# Patient Record
Sex: MALE | Race: WHITE
[De-identification: names, ages, dates, MRNs, and addresses within clinical notes are randomized per-mention and may not be internally consistent; named-entity substitution may affect disease eponyms.]

---

## 2018-04-16 NOTE — HP
HISTORY AND PHYSICAL:

 

DATE OF SURGERY:  04/24/18

 

DATE OF OFFICE VISIT:  04/13/18

 

SURGEON:  Latasha Loya MD * (DICTATED BY ILSA LOYA)

 

PROCEDURE:  Left total hip arthroplasty.

 

CHIEF COMPLAINT:  Left hip pain.

 

HISTORY OF PRESENT ILLNESS:  Mr. Tolliver is an 82-year-old gentleman with 
complaints of left hip pain.  He failed conservative management and elected to 
proceed with the left total hip arthroplasty which is scheduled for 04/24/18 
with Dr. Loya.

 

PAST MEDICAL HISTORY:  Coronary artery disease; prior AFib; COPD; hypertension; 
BPH; diabetes, diet controlled; hyperlipidemia; and a prior MI.

 

PAST SURGICAL HISTORY:  Left atrial ablation, tonsillectomy, adenoidectomy, 
appendectomy, left parotidectomy, and cataract removal.

 

CURRENT MEDICATIONS:

1.  Atorvastatin.

2.  Calcium 10 mg q.h.s.

3.  Metoprolol 25 mg half tab daily.

4.  Saw palmetto.

5.  Allopurinol 300 mg every day.

6.  Respimat inhaler.

7.  Vitamin C.

8.  Naproxen as needed.

9.  Calcium.

10.  Aspirin 81 mg daily.

 

ALLERGIES:  PENICILLIN.

 

FAMILY HISTORY:  Heart disease and osteoarthritis.

 

SOCIAL HISTORY:  This is an 82-year-old gentleman, lives with his wife.  He is 
a former smoker, quit 25 years ago.  Denies use of drugs.  Uses occasional 
alcohol.

 

REVIEW OF SYSTEMS:  A complete 14-point review of systems was reviewed with the 
patient, was positive for diabetes, COPD, and occasional shortness of breath.  
He denies history of DVT, PE, hepatitis C, or HIV.

 

                               PHYSICAL EXAMINATION

 

GENERAL:  He is well-developed, well-nourished, in no acute distress.

 

VITAL SIGNS:  He stands 6 feet tall, weighs 227 pounds.  His blood pressure is 
160/80, his heart rate is 65.

 

HEENT:  Normocephalic, atraumatic.

 

NECK:  Supple.  No palpable lymph nodes.

 

PULMONARY:  The lungs are clear to auscultation bilaterally.

 

CARDIO:  Regular rate and rhythm.  Strong S1 and S2.

 

ABDOMEN:  Soft, nontender, nondistended.

 

NEUROLOGIC:  He is alert and oriented x3.  Cranial nerves II through XII are 
intact.

 

MUSCULOSKELETAL:  Left lower extremity, the skin is intact.  There are no open 
wounds or abrasions.  He walks with antalgic type gait, favoring his left hip.  
He has decreased internal and external rotation of the left hip.  He has 2+ 
dorsalis pedis pulses, intact sensation.  His lower extremity muscle group 
strengths are intact at 5/5.

 

 ASSESSMENT AND PLAN:  Mr. Tolliver is an 82-year-old gentleman with continued 
complaints of left hip pain.  He has failed conservative management and elected 
to proceed with the left total hip arthroplasty, which is scheduled for 04/24/
18 with Dr. Loya.  Dr. Loya discussed the risks and benefits of the surgery 
at today's visit and all of his questions were answered.  He will follow up 
with Dr. Loya in 2 weeks after the surgery.

 

 ____________________________________ LISA LOYA

 

[id number]/CPS #: [id number]

MTDD

## 2018-04-24 ENCOUNTER — HOSPITAL ENCOUNTER (INPATIENT)
Dept: HOSPITAL 25 - AA | Age: 83
LOS: 2 days | Discharge: HOME HEALTH | DRG: 470 | End: 2018-04-26
Attending: ORTHOPAEDIC SURGERY | Admitting: ORTHOPAEDIC SURGERY
Payer: MEDICARE

## 2018-04-24 DIAGNOSIS — Z82.61: ICD-10-CM

## 2018-04-24 DIAGNOSIS — F32.9: ICD-10-CM

## 2018-04-24 DIAGNOSIS — R31.9: ICD-10-CM

## 2018-04-24 DIAGNOSIS — J44.9: ICD-10-CM

## 2018-04-24 DIAGNOSIS — I08.1: ICD-10-CM

## 2018-04-24 DIAGNOSIS — E11.9: ICD-10-CM

## 2018-04-24 DIAGNOSIS — I25.9: ICD-10-CM

## 2018-04-24 DIAGNOSIS — Z90.49: ICD-10-CM

## 2018-04-24 DIAGNOSIS — Z88.0: ICD-10-CM

## 2018-04-24 DIAGNOSIS — N32.89: ICD-10-CM

## 2018-04-24 DIAGNOSIS — Z98.41: ICD-10-CM

## 2018-04-24 DIAGNOSIS — Z88.8: ICD-10-CM

## 2018-04-24 DIAGNOSIS — Z79.01: ICD-10-CM

## 2018-04-24 DIAGNOSIS — N40.0: ICD-10-CM

## 2018-04-24 DIAGNOSIS — M16.12: Primary | ICD-10-CM

## 2018-04-24 DIAGNOSIS — Z98.42: ICD-10-CM

## 2018-04-24 DIAGNOSIS — Z87.891: ICD-10-CM

## 2018-04-24 DIAGNOSIS — M10.9: ICD-10-CM

## 2018-04-24 DIAGNOSIS — Z81.8: ICD-10-CM

## 2018-04-24 DIAGNOSIS — Z95.5: ICD-10-CM

## 2018-04-24 DIAGNOSIS — N39.0: ICD-10-CM

## 2018-04-24 DIAGNOSIS — E66.9: ICD-10-CM

## 2018-04-24 DIAGNOSIS — I25.2: ICD-10-CM

## 2018-04-24 DIAGNOSIS — I10: ICD-10-CM

## 2018-04-24 DIAGNOSIS — E78.5: ICD-10-CM

## 2018-04-24 DIAGNOSIS — Z79.82: ICD-10-CM

## 2018-04-24 DIAGNOSIS — I48.0: ICD-10-CM

## 2018-04-24 DIAGNOSIS — Z82.49: ICD-10-CM

## 2018-04-24 DIAGNOSIS — Z72.89: ICD-10-CM

## 2018-04-24 DIAGNOSIS — I25.10: ICD-10-CM

## 2018-04-24 PROCEDURE — C1713 ANCHOR/SCREW BN/BN,TIS/BN: HCPCS

## 2018-04-24 PROCEDURE — 88311 DECALCIFY TISSUE: CPT

## 2018-04-24 PROCEDURE — 72170 X-RAY EXAM OF PELVIS: CPT

## 2018-04-24 PROCEDURE — 81003 URINALYSIS AUTO W/O SCOPE: CPT

## 2018-04-24 PROCEDURE — 88304 TISSUE EXAM BY PATHOLOGIST: CPT

## 2018-04-24 PROCEDURE — 85018 HEMOGLOBIN: CPT

## 2018-04-24 PROCEDURE — 85610 PROTHROMBIN TIME: CPT

## 2018-04-24 PROCEDURE — 85014 HEMATOCRIT: CPT

## 2018-04-24 PROCEDURE — 85049 AUTOMATED PLATELET COUNT: CPT

## 2018-04-24 PROCEDURE — 87086 URINE CULTURE/COLONY COUNT: CPT

## 2018-04-24 PROCEDURE — 71045 X-RAY EXAM CHEST 1 VIEW: CPT

## 2018-04-24 PROCEDURE — 81015 MICROSCOPIC EXAM OF URINE: CPT

## 2018-04-24 PROCEDURE — 83036 HEMOGLOBIN GLYCOSYLATED A1C: CPT

## 2018-04-24 PROCEDURE — 0SRB04A REPLACEMENT OF LEFT HIP JOINT WITH CERAMIC ON POLYETHYLENE SYNTHETIC SUBSTITUTE, UNCEMENTED, OPEN APPROACH: ICD-10-PCS | Performed by: ORTHOPAEDIC SURGERY

## 2018-04-24 PROCEDURE — C1776 JOINT DEVICE (IMPLANTABLE): HCPCS

## 2018-04-24 PROCEDURE — 36415 COLL VENOUS BLD VENIPUNCTURE: CPT

## 2018-04-24 PROCEDURE — 80048 BASIC METABOLIC PNL TOTAL CA: CPT

## 2018-04-24 RX ADMIN — Medication SCH INH: at 17:20

## 2018-04-24 RX ADMIN — MAGNESIUM HYDROXIDE SCH ML: 400 SUSPENSION ORAL at 20:55

## 2018-04-24 RX ADMIN — ALLOPURINOL SCH MG: 300 TABLET ORAL at 17:16

## 2018-04-24 RX ADMIN — ASPIRIN SCH MG: 81 TABLET, COATED ORAL at 17:16

## 2018-04-24 RX ADMIN — INSULIN LISPRO SCH: 100 INJECTION, SOLUTION INTRAVENOUS; SUBCUTANEOUS at 17:05

## 2018-04-24 RX ADMIN — IBUPROFEN SCH MG: 600 TABLET, FILM COATED ORAL at 17:15

## 2018-04-24 RX ADMIN — DOCUSATE SODIUM SCH MG: 100 CAPSULE, LIQUID FILLED ORAL at 20:55

## 2018-04-24 RX ADMIN — METOPROLOL SUCCINATE SCH MG: 25 TABLET, EXTENDED RELEASE ORAL at 17:17

## 2018-04-24 NOTE — XMS REPORT
Tom Tolliver

 Created on:2018



Patient:Tom Tolliver

Sex:Male

:1935

External Reference #:[id number]





Demographics







 Address  [address]



   [place] [address]

 

 Home Phone  [phone]

 

 Mobile Phone  [phone]

 

 Email Address  [email or web address]

 

 Preferred Language  English

 

 Marital Status  Not  Or 

 

 Episcopal Affiliation  Unknown

 

 Race  White

 

 Ethnic Group  Not  Or 









Author







 Organization  [facility]

 

 Address  [address] [address]

 

 Phone  [phone]









Support







 Name  Relationship  Address  Phone

 

 Cintia Estevez  Unavailable  Unavailable  [phone]









Care Team Providers







 Name  Role  Phone

 

 Kodak Aguilera MD  Primary Care Physician  Unavailable









Payers







 Type  Date  Identification Numbers  Payment  Subscriber



       Provider  

 

 Health Maintenance  Effective:  Policy Number:  Medicare Blue  Tom Tolliver



 [facility] ([facility]  2013  [id number]  o  









 Group Number: [id number]  [address] [address]

 

 PayID:   [place] [id number]







Problems







 Date  Description  Provider  Status

 

 Onset: 2013  Postsurgical Status Other  Mitzi Bishop D.O.  Active

 

 Onset: 2013  Atrial fibrillation  Mitzi Bishop D.O.  Active

 

 Onset: 2014  Benign essential hypertension  Ian Sandoval M.D., [facility],  
Active



     [facility]  

 

 Onset: 2014  Hyperlipidemia  Ian Sandoval M.D., [facility],  Active



     [facility]  

 

 Onset: 2014  Coronary arteriosclerosis  Ian Sandoval M.D., [facility],  Active



     [facility]  

 

 Onset: 10/01/2014  Chronic ischemic heart disease  Ian Sandoval M.D., [facility],  
Active



     [facility]  

 

 Onset: 2016  Essential hypertension  Ian Sandoval M.D., [facility],  Active



     [facility]  

 

 Onset: 2016  Paroxysmal atrial fibrillation  Ian Sandoval M.D., [facility],  
Active



     [facility]  

 

 Onset: 2018  Preoperative cardiovascular  Ian Sandoval M.D., [facility],  
Active



   examination  [facility]  

 

 Onset: 2018  Localized, primary osteoarthritis  Latasha Loya M.D.  
Active



   of the pelvic region and thigh    







Family History







 Date  Family Member(s)  Problem(s)  Comments

 

   General  Heart Disease  

 

   Children  One son , one living.  Son living in good health.







Social History







 Type  Date  Description  Comments

 

 Marital Status      

 

 Lives With    Spouse  

 

 Occupation    Physician's Assistant  Retired

 

 ETOH Use    Occasionally consumes alcohol  one or two drinks daily

 

 Recreational Drug Use    Denies Drug Use  

 

 Smoking    Patient is a former smoker  

 

 Daily Caffeine    Consumes on average 3 cups of  



     regular coffee per day  

 

 Daily Caffeine    consumes chocolate frequently  

 

 Exercise Type/Frequency    Exercises regularly  exercise class twice a



       week and walking the dog



       daily







Allergies, Adverse Reactions, Alerts







 Date  Description  Reaction  Status  Severity  Comments

 

 2013  Penicillins    active  Severe  

 

 2013  NKDA    inactive    

 

 2018  Statins  myalgias  inactive    







Medications







 Medication  Date  Status  Form  Strength  Qnty  SIG  Indications  Ordering



                 Provider

 

 Metoprolol    Active  Tablets  25mg  90tab  1/2 tab by    Ian



 Succinate ER  /    ER 24HR    s  mouth daily    TIA Sandoval,



                 [facility],



                 [facility]

 

 St Johns Wort    Active  Capsules  300mg    3 tabs daily    Other



 Extract  /2013              Ordering



                 Provider

 

 Paramjit Concord    Active  Capsules  500mg    1080mg daily                  Ordering



                 Provider

 

 Allopurinol    Active  Tablets  300mg    1 by mouth    Unknown



   /0000          every day    

 

 Stiolto    Active  Aerosol  2.5-2.5mc    2 puff once    Unknown



 Respimat  /0000      g/Act    daily    

 

 Vitamin C    Active            Unknown



   /0000              

 

 Naproxen    Active  Tablets  250mg  60tab  1 tablet by    Unknown



   /0000        s  mouth twice a    



             day as needed    



             pain, with    



             foods    

 

 Calcium    Active            Unknown



   /0000              

 

                 

 

 Meloxicam    Hx  Tablets  15mg  30tab  1 by mouth  M25.552  Latasha



           s  every day Monse Chatman M.D.



                 

 

 Pravastatin    Hx  Tablets  10mg  45tab  1 by mouth    Ian



 Sodium  /        s  every other    Monse Sandoval.    M.D.,



                 [facility]              [facility]

 

 Atorvastatin    Hx  Tablets  10mg  90tab  take 1 tablet    Ian



 Calcium  /        s  at bedtime    Monse Sandoval M.D.,



                 [facility]              [facility]

 

 Warfarin Sodium    Hx  Tablets  5mg  180ta  take 1-2 tabs  I48.0          bs  by mouth    Jaime,



   -          daily as    M.D.,



             directed    [facility]              [facility]

 

 Atorvastatin    Hx  Tablets  20mg  90tab  take 1 tablet    Ian



 Calcium          s  at bedtime -    Jaime,



   -          Patient    MBHAVIN,



             Stopped    [facility]          Taking This    [facility]



             Last Night    

 

 Metoprolol    Hx  Tablets  25mg    1 po qd    Mitzi



 Succinate ER      ER 24HR          Edna



   -              D.O.



                 

 

 Metoprolol    Hx  Tablets  25mg  90tab  1 po daily    Mitzi



 Succinate ER      ER 24HR    s      Edna



   -              D.O.



                 

 

 Lisinopril    Hx  Tablets  10mg  90tab  1 by mouth            s  every day    Monse Sandoval M.D.,



                 [facility]              [facility]

 

 Clopidogrel    Hx  Tablets  75mg  90tab  1 po qd    Mitzi        s      Monse Bishop              D.O.



                 

 

 Atorvastatin    Hx  Tablets  10mg  90tab  1 po qd    Mitzi



 Calcium          s      Monse Bishop              D.O.



                 

 

 Coumadin    Hx  Tablets  5mg  90tab  1 tablet by    Mitzi        s  mouth daily    Edna,



   -          or as    D.O.



                 

 

 Home Inr Mahine    Hx      1unit  Check inr    Mitzi



 And Test Strips          s  weekly/prn as    Monse Bishop          directed    D.O.



                 

 

 Carvedilol    Hx  Tablets  6.25mg  180ta  1 po bid            bs      Ordering



   -              Provider



                 

 

 Lisinopril    Hx  Tablets  10mg  90tab  1 po qd    Other



           s      Ordering



   -              Provider



                 

 

 Atorvastatin    Hx  Tablets  10mg  90tab  1 po qd    Other



 Calcium          s      Ordering



   -              Provider



                 

 

 Spiriva    Hx  Capsules  18mcg  30cap  1 inhalation    Other



 Handihaler  /        s  po qam    Ordering



   -              Provider



                 

 

 Testosterone    Hx  Oil  200mg/ml  2unit  inject 1.0    Other



 Cypionate  /        s  ml once every    Ordering



   -          other week    Provider



                 

 

 Vitamin D-3    Hx  Tablets  400Unit  90tab  1 po qd            s      Ordering



   -              Provider



                 

 

 Coreg CR    Hx  Caps ER  10mg  30cap  1 capsule by        24HR    s  mouth daily    Monse Bishop              D.O.



                 

 

 Nitroglycerin    Hx  Tablets  0.4mg  25tab  1        Sub    s  sublingually    Edna,



   -          prn chest    D.O.



             pain    



                 

 

 Coumadin    Hx  Tablets  5mg  30tab  1 tablet by            s  mouth daily    Edna,



   -          or as    D.O.



             directed    



                 

 

 Aspirin Low    Hx  Tablets  81mg  30tab  1 po qd    Mitzi



 Dose  /2013        s      Edna



   -              D.O.



                 

 

 Plavix    Hx  Tablets  75mg  90tab  1 po qd            denisse Bishop



   -              D.O.



                 

 

 Lisinopril  00  Hx  Tablets  5mg  90tab  1 by mouth    Ian



   /0000        s  every day    Monse Sandoval M.D.,



                 [facility],



   /              [facility]







Immunizations







 CPT Code  Status  Date  Vaccine  Lot #

 

 [id number]  Given  10/01/2014  Influenza Virus 3Yrs &amp; Over  







Vital Signs







 Date  Vital  Result  Comment

 

 2018  Height  69.25 inches  5'9.25"









 Weight  227.00 lb  

 

 Heart Rate  65 /min  

 

 BP Systolic  160 mmHg  

 

 BP Diastolic  80 mmHg  

 

 Respiratory Rate  15 /min  

 

 Body Temperature  98.3 F  

 

 Pain Level  1  

 

 BMI (Body Mass Index)  33.3 kg/m2  









 2018  Height  69.25 inches  5'9.25"









 Weight  225.00 lb  w/o shoes

 

 Heart Rate  62 /min  

 

 BP Systolic Sitting  142 mmHg  lue large cuff

 

 BP Diastolic Sitting  72 mmHg  lue large cuff

 

 BP Systolic Standing  162 mmHg  lue large cuff

 

 BP Diastolic Standing  68 mmHg  lue large cuff

 

 Respiratory Rate  18 /min  

 

 BMI (Body Mass Index)  33.0 kg/m2  

 

 Ejection Fraction  55-60%  echo 8/15/13









 2018  Height  69.25 inches  5'9.25"









 Weight  228.00 lb  w/ shoes

 

 Heart Rate  70 /min  

 

 BP Systolic Sitting  139 mmHg  lue large cuff

 

 BP Diastolic Sitting  68 mmHg  lue large cuff

 

 BP Systolic Standing  132 mmHg  lue large cuff

 

 BP Diastolic Standing  60 mmHg  lue large cuff

 

 Respiratory Rate  18 /min  

 

 BMI (Body Mass Index)  33.4 kg/m2  

 

 Ejection Fraction  55-60%  echo 8/15/13









 2018  Height  69.25 inches  5'9.25"









 Weight  225.00 lb  

 

 Heart Rate  64 /min  

 

 BP Systolic  158 mmHg  

 

 BP Diastolic  80 mmHg  

 

 BMI (Body Mass Index)  33.0 kg/m2  









 2016  Height  70 inches  5'10"









 Weight  219.00 lb  

 

 Heart Rate  66 /min  68

 

 BP Systolic Sitting  138 mmHg  right arm, reg cuff

 

 BP Diastolic Sitting  70 mmHg  right arm, reg cuff

 

 BP Systolic Standing  134 mmHg  right arm, reg cuff

 

 BP Diastolic Standing  68 mmHg  right arm, reg cuff

 

 Respiratory Rate  16 /min  

 

 BMI (Body Mass Index)  31.4 kg/m2  

 

 Ejection Fraction  55-60%  8/15/13









 2016  Height  70 inches  5'10"









 Weight  210.00 lb  

 

 Heart Rate  70 /min  72

 

 BP Systolic Sitting  106 mmHg  right arm, reg cuff

 

 BP Diastolic Sitting  64 mmHg  right arm, reg cuff

 

 BP Systolic Standing  100 mmHg  right arm, reg cuff

 

 BP Diastolic Standing  62 mmHg  right arm, reg cuff

 

 Respiratory Rate  16 /min  

 

 BMI (Body Mass Index)  30.1 kg/m2  

 

 Ejection Fraction  55-60%  8/15/13









 2015  Height  70 inches  5'10"









 Weight  207.00 lb  

 

 Heart Rate  74 /min  84

 

 BP Systolic  139 mmHg  Ra, Home BP cuff

 

 BP Diastolic  88 mmHg  Ra, Home BP cuff

 

 BP Systolic Sitting  128 mmHg  right arm, reg cuff

 

 BP Diastolic Sitting  78 mmHg  right arm, reg cuff

 

 BP Systolic Standing  120 mmHg  right arm, reg cuff

 

 BP Diastolic Standing  78 mmHg  right arm, reg cuff

 

 Respiratory Rate  16 /min  

 

 BMI (Body Mass Index)  29.7 kg/m2  

 

 Ejection Fraction  55-60%  8/15/13









 2015  Height  70 inches  5'10"









 Weight  208.00 lb  

 

 Heart Rate  68 /min  72

 

 BP Systolic Sitting  112 mmHg  left arm, reg cuff

 

 BP Diastolic Sitting  68 mmHg  left arm, reg cuff

 

 BP Systolic Standing  114 mmHg  left arm, reg cuff

 

 BP Diastolic Standing  68 mmHg  left arm, reg cuff

 

 Respiratory Rate  20 /min  

 

 BMI (Body Mass Index)  29.8 kg/m2  

 

 Ejection Fraction  55-60%  8/15/13









 2015  Height  70 inches  5'10"









 Weight  211.00 lb  w/o shoes

 

 Heart Rate  58 /min  standing- 58

 

 BP Systolic Sitting  158 mmHg  Ra, reg

 

 BP Diastolic Sitting  88 mmHg  Ra, reg

 

 BP Systolic Standing  150 mmHg  Ra, reg

 

 BP Diastolic Standing  80 mmHg  Ra, reg

 

 BMI (Body Mass Index)  30.3 kg/m2  

 

 Ejection Fraction  55-60%  8/15/13 ECHO









 10/01/2014  Height  70 inches  5'10"









 Weight  212.00 lb  

 

 Heart Rate  62 /min  70

 

 BP Systolic Sitting  144 mmHg  left arm, reg cuff

 

 BP Diastolic Sitting  90 mmHg  left arm, reg cuff

 

 BP Systolic Standing  134 mmHg  left arm, reg cuff

 

 BP Diastolic Standing  82 mmHg  left arm, reg cuff

 

 Respiratory Rate  14 /min  

 

 BMI (Body Mass Index)  30.4 kg/m2  









 2014  Height  70 inches  5'10"









 Weight  210.00 lb  with shoes

 

 Heart Rate  70 /min  64 sit and stand HR

 

 BP Systolic Sitting  130 mmHg  LA reg cuff

 

 BP Diastolic Sitting  82 mmHg  LA reg cuff

 

 BP Systolic Standing  126 mmHg  LA reg cuff

 

 BP Diastolic Standing  80 mmHg  LA reg cuff

 

 Respiratory Rate  16 /min  

 

 BMI (Body Mass Index)  30.1 kg/m2  









 2014  Height  70 inches  5'10"









 Weight  209.00 lb  

 

 Heart Rate  54 /min  60

 

 BP Systolic Sitting  134 mmHg  right arm, reg cuff

 

 BP Diastolic Sitting  80 mmHg  right arm, reg cuff

 

 BP Systolic Standing  122 mmHg  right arm, reg cuff

 

 BP Diastolic Standing  78 mmHg  right arm, reg cuff

 

 Respiratory Rate  16 /min  

 

 BMI (Body Mass Index)  30.0 kg/m2  









 2014  Height  72 inches  6'0"









 Weight  210.75 lb  

 

 Heart Rate  60 /min  

 

 BP Systolic Sitting  130 mmHg  

 

 BP Diastolic Sitting  72 mmHg  

 

 BMI (Body Mass Index)  28.6 kg/m2  









 2013  Height  72 inches  6'0"









 Weight  211.50 lb  

 

 Heart Rate  60 /min  Regular

 

 BP Systolic Sitting  130 mmHg  

 

 BP Diastolic Sitting  76 mmHg  

 

 BMI (Body Mass Index)  28.7 kg/m2  









 2013  Height  72 inches  6'0"









 Weight  207.00 lb  

 

 Heart Rate  60 /min  

 

 BP Systolic  122 mmHg  

 

 BP Diastolic  66 mmHg  

 

 BMI (Body Mass Index)  28.1 kg/m2  









 2013  Height  72 inches  6'0"









 Weight  208.00 lb  

 

 Heart Rate  60 /min  Irregular

 

 BP Systolic Sitting  110 mmHg  

 

 BP Diastolic Sitting  68 mmHg  

 

 BMI (Body Mass Index)  28.2 kg/m2  







Results







 Test  Date  Test  Result  H/L  Range  Note

 

 Basic Metabolic Panel  2016  Sodium  137 mmol/L    133-145  









 Potassium  4.7 mmol/L    3.5-5.0  

 

 Chloride  103 mmol/L    101-111  

 

 Co2 Carbon Dioxide  28 mmol/L    22-32  

 

 Anion Gap  6 mmol/L    2-11  

 

 Glucose  118 mg/dL  High    

 

 Creatinine  1.78 mg/dL  High  0.67-1.17  

 

 Calcium  9.9 mg/dL    8.6-10.3  

 

 Egfr Non-  36.9    &gt;60  

 

 Egfr   47.4    &gt;60  1

 

 Blood Urea Nitrogen  32 mg/dL  High  6-24  

 

 BUN/Creatinine Ratio  18.0    8-20  









 Laboratory test finding  2016  Alt (SGPT)  13 U/L    7-52  









 Ast (Sgot)  17 U/L    13-39  

 

 Uric Acid  6.5 mg/dL    4.4-7.6  









 Lipid Profile (Trig/Chol/HDL)  2016  Triglycerides  227 mg/dL      2









 Cholesterol  176 mg/dL      3

 

 HDL Cholesterol  36.4 mg/dL      4

 

 LDL Cholesterol  94 mg/dL      5









 Lipid Profile (Trig/Chol/HDL)  2014  Triglycerides  77 mg/dL      6, 7









 Cholesterol  127 mg/dL      6, 8

 

 HDL Cholesterol  42.9 mg/dL      6, 9

 

 LDL Cholesterol  69 mg/dL      6, 10









 Laboratory test finding  2014  Alt  13 U/L    7-52  6, 11









 Ast  17 U/L    13-39  6, 12









 Order  2014  Event Monitor  &lt;pending&gt;      

 

 Laboratory test finding  2013  Alt  14 U/L    14-54  









 Ast  17 U/L    12-42  









 Lipid Profile (Trig/Chol/HDL)  2013  Triglycerides  112 mg/dL      









 Cholesterol  175 mg/dL    Less than 200  

 

 HDL Cholesterol  43 mg/dL    40-60  13

 

 Cholesterol/HDL Ratio  4.1 Average    1-4.44  

 

 LDL Cholesterol  109.6  High  Less Than 100  14









 1  *******Because ethnic data is not always readily available,



   this report includes an eGFR for both -Americans and



   non- Americans.****



   The National Kidney Disease Education Program (NKDEP) does



   not endorse the use of the MDRD equation for patients that



   are not between the ages of 18 and 70, are pregnant, have



   extremes of body size, muscle mass, or nutritional status,



   or are non- or non-.



   According to the National Kidney Foundation, irrespective of



   diagnosis, the stage of the disease is based on the level of



   kidney function:



   Stage Description                      GFR(mL/min/1.73 m(2))



   1     Kidney damage with normal or decreased GFR       90



   2     Kidney damage with mild decrease in GFR          60-89



   3     Moderate decrease in GFR                         30-59



   4     Severe decrease in GFR                           15-29



   5     Kidney failure                       &lt;15 (or dialysis)

 

 2  Desirable &lt;150



   Borderline high 150-199



   High 200-499



   Very High &gt;500

 

 3  Desirable &lt;200



   Borderline high 200-239



   High &gt;239

 

 4  Low &lt;40



   Desirable: 40-60



   High: &gt;60

 

 5  Desirable: &lt;100 mg/dL



   Near Optimal: 100-129 mg/dL



   Borderline High: 130-159 mg/dL



   High: 160-189 mg/dL



   Very High: &gt;189 mg/dL

 

 6  FASTING

 

 7  Desirable &lt;150



   Borderline high 150-199



   High 200-499



   Very High &gt;500

 

 8  Desirable &lt;200



   Borderline high 200-239



   High &gt;239

 

 9  Low &lt;40



   Desirable: 40-60



   High: &gt;60

 

 10  Desirable &lt;100



   Near Optimal 100-129



   Borderline high 130-159



   High 160-189



   Very High &gt;189

 

 11  FASTING

 

 12  FASTING

 

 13  HDL Interpretation:



   Undesirable: High Risk:  Less than 40 mg/dL



   Desirable:  Low Risk:  Greater than 60 mg/dL

 

 14  LDL Interpretation:



   Low Risk Optimal Level:  LDL Less than 100 mg/dL



   Near or Above Optimal:  -129 mg/dL



   Borderline High Risk:  -159 mg/dL



   High Risk:  -189 mg/dL



   Very High Risk:  LDL Greater than 189 mg/dL







Procedures







 Date  CPT Code  Description  Status

 

 04/10/2018  73879  Treadmill Interp/Report Only  Completed

 

 04/10/2018  82178  Stress Test Supervsn W/Out I/R  Completed

 

 2018  75766  EKG Tracing &amp; Interpretation  Completed

 

 2017  26883  Destruction Of Benign Lesions Any Method 1-14 lesions  
Completed

 

 2017  22649  Dest Lesion Each Addl Lesion 2 Through 14 Each  Completed

 

 2017  10211  Destruction ALL Benign Or Premalignant Lesion (Other  
Completed



     Than Skintag  

 

 2016  05468  EKG Tracing &amp; Interpretation  Completed

 

 2015  19753  Holter Monitor Review (24 hr)dr sandra &amp; interp  
Completed



     only  

 

 2015  97534  Treadmill Interp/Report Only  Completed

 

 2015  50329  Treadmill Interp/Report Only  Completed

 

 2015  78522  Stress Test Supervsn W/Out I/R  Completed

 

 2015  39078  Stress Test Supervsn W/Out I/R  Completed

 

 2015  73843  EKG Tracing &amp; Interpretation  Completed

 

 2015  07541  EKG Tracing &amp; Interpretation  Completed

 

 2014  87874  Cardiac Event Monitor  Completed

 

 2014  28609  Stress Test Supervsn W/Out I/R  Completed

 

 2014  95136  Treadmill Interp/Report Only  Completed

 

 2014  95707  EKG Tracing &amp; Interpretation  Completed

 

 2013  96202  EKG Tracing &amp; Interpretation  Completed

 

 2013  35846  EKG Tracing &amp; Interpretation  Completed

 

 2013  17238  EKG, Interpretation Only  Completed

 

 2013  84177  Left Heart Cath. Incl S/I Coronaries, Angio S/I V Gram  
Completed



     If Done  

 

 2013  77003  Cath PLMT&amp;NJX L Ventriculog Img S&amp;I  Completed

 

 2013  15472  EKG, Interpretation Only  Completed

 

 2013  57480  Ptca W/Intracor Stent  Completed

 

 2013  35127  Revascularization Acute Total/Subtotal Occlusion  Completed

 

 2013  62981  IV Rx Trancath Therapy(Nitro/Maria Del Rosario)  Completed

 

 08/15/2013  78890  Color Flow Doppler/Interp &amp; Reprt  Completed

 

 08/15/2013  22455  Pulse Wave/Continuous-Interp.RPT  Completed

 

 08/15/2013  30723  ECHO Transthorasic Realtime 2D W Doppler &amp; Color  
Completed



     Flow Hosp  

 

 08/15/2013  65756  EKG, Interpretation Only  Completed







Encounters







 Type  Date  Location  Provider  CPT E/M  Dx

 

 Office Visit  2018  [facility]  Ian Sandoval M.D.,  [address]78  
Z01.810



   3:00p  [facility]    









 M16.0

 

 I25.10









 Office Visit  2018 10:40a  [facility]  Ian Sandoval M.D.,  
16726  I25.10



     [facility]    









 E78.5

 

 R06.02









 Office Visit  2018  2:00p  [facility]  Latasha Loya M.D.  
10551  M25.551



     C.M.ANatalia      









 M25.552

 

 M16.0









 Office Visit  2017  3:00p  [facility]  Nagi Curry MD  [address]  
L03.818

 

 Office Visit  2017  2:00p  [facility]  Nagi Curry MD  92979  
I78.8









 L82.1

 

 B35.1

 

 B07.0

 

 R23.8

 

 Z78.9

 

 L57.0









 Office Visit  2016 11:20a  [facility]  Ian Sandoval M.D.,  
46900  I48.0



     [facility]    









 I10

 

 I25.10

 

 E78.5









 Office Visit  2016  8:36a  [facility],  Jase Leos,  
21925  R04.0



     Hospitalists  M.D.    









 I48.2

 

 J44.9

 

 I10









 Office Visit  2016  8:36a  [facility]  [address]  
R04.0



     Assoc, Hospitalists  M.D.    









 I48.2

 

 J44.9

 

 I10









 Office Visit  2016 10:00a  [facility] Iván Sandoval M.D.,  
80064  I48.0



     [facility], [facility]    









 I10

 

 E78.5

 

 I25.9









 Office Visit  2015  2:20p  [facility] Iván Sandoval M.D.,  
73484  427.31



     [facility]    









 401.1

 

 272.4

 

 414.9









 Office Visit  2015  1:40p  [facility] Iván Sandoval M.D.,  
91086  427.31



     [facility]    









 401.1

 

 272.4

 

 414.9









 Office Visit  2015  9:17a  [facility] Jorge L Romo,  
08617  427.31



     Carmel WEBER    









 427.89









 Office Visit  2015  1:20p  [facility] Iván Sandoval M.D.,  
75081  414.9



     [facility], [facility]    









 272.4

 

 401.1









 Office Visit  10/01/2014  2:20p  [facility] Iván Sandoval M.D.,  
04345  427.31



     [facility]    









 401.1

 

 272.4

 

 414.9









 Office Visit  2014  3:00p  [facility] Iván Sandoval M.D.,  
30715  401.1



     [facility]    









 272.4

 

 414.01

 

 427.31









 Office Visit  2014  3:00p  [facility] Iván Sandoval M.D.,  
91966  414.01



     [facility]    









 401.1

 

 272.4

 

 427.31









 Office Visit  2014 10:40a  [facility]  Mitzi Bishop D.O.  
49227  414.01









 401.1

 

 272.4









 Office Visit  2013 10:40a  [facility]  Mitzi Bishop D.O.  
39116  414.01









 V45.89

 

 401.1

 

 272.4









 Office Visit  2013  1:20p  [facility]  Mitzi Bishop D.O.  
97388  427.31









 414.01

 

 410.70

 

 401.1

 

 272.4









 Office Visit  2013  1:20p  [facility]  Mitzi Bishop D.O.  
51206  V45.89









 410.70

 

 414.01

 

 427.31

 

 401.1

 

 272.4









 Office Visit  2013  4:13p  [facility],  Yusuf Barkley M.D.  
47015  410.71



     Hospitalists      









 401.9









 Office Visit  2013  9:19a  [facility]  Ray Cedillo  08972
  786.50



       MNataliaDNatalia    









 410.31

 

 401.1









 Office Visit  2013  4:12p  [facility],kumar Barkley M.D.  
20225  410.71



     Hospitalists      









 401.9









 Office Visit  2013  9:50a  [facility]  Ray Cedillo  97920
  427.31



       MNataliaDNatalia    









 427.32

 

 401.1









 Office Visit  08/15/2013  4:11p  [facility],  Yusuf Barkley M.D.  
44148  410.71



     Hospitalists      









 401.9









 Office Visit  08/15/2013  9:18a  [facility]  Ray SHIVANINatalia Majuliet,  71754
  427.31



       M.DNatalia    









 250.00









 Office Visit  2013  9:45a  [facility]  Janette Alvarez,  
44395  883.0



     Of NICK WEBER    

 

 Office Visit  2013  9:15a  [facility]  Janette Alvarez,  
31236  883.0



     Of NICK WEBER    







Plan of Care

Future Appointment(s):2018  1:15 pm - [facility] M.[facility]. at [facility]2018  7:30 am - Rao Rubio PA-C [facility]  7:30 am - LISA Benavidez [facility]  7:30 am - Latasha Loya M.D. [facility] - Latasha Loya M.D.M25.552 Pain in left hipFollow 
up:Follow up:   2 weeks after [id number] Unilateral primary osteoarthritis, 
left hip

## 2018-04-24 NOTE — XMS REPORT
Tom Tolliver

 Created on:2018



Patient:Tom Tolliver

Sex:Male

:1935

External Reference #:[id number]





Demographics







 Address  [address]



   [place] [address]

 

 Home Phone  [phone]

 

 Mobile Phone  [phone]

 

 Email Address  [email or web address]

 

 Preferred Language  English

 

 Marital Status  Not  Or 

 

 Anglican Affiliation  Unknown

 

 Race  White

 

 Ethnic Group  Not  Or 









Author







 Organization  [facility]

 

 Address  [address] [address]

 

 Phone  [phone]









Support







 Name  Relationship  Address  Phone

 

 Cintia Estevez  Unavailable  Unavailable  [phone]









Care Team Providers







 Name  Role  Phone

 

 Kodak Aguilera MD  Primary Care Physician  Unavailable









Payers







 Type  Date  Identification Numbers  Payment  Subscriber



       Provider  

 

 Health Maintenance  Effective:  Policy Number:  Medicare Blue  Tom Tolliver



 [facility] ([facility]  2013  [id number]  o  









 Group Number: [id number]  [address] [address]

 

 PayID:   [place] [id number]







Problems







 Date  Description  Provider  Status

 

 Onset: 2013  Postsurgical Status Other  Mitzi Bishop D.O.  Active

 

 Onset: 2013  Atrial fibrillation  Mitzi Bishop D.O.  Active

 

 Onset: 2014  Benign essential hypertension  Ian Sandoval M.D., [facility],  
Active



     [facility]  

 

 Onset: 2014  Hyperlipidemia  Ian Sandoval M.D., [facility],  Active



     [facility]  

 

 Onset: 2014  Coronary arteriosclerosis  Ian Sandoval M.D., [facility],  Active



     [facility]  

 

 Onset: 10/01/2014  Chronic ischemic heart disease  Ian Sandoval M.D., [facility],  
Active



     [facility]  

 

 Onset: 2016  Essential hypertension  Ian Sandoval M.D., [facility],  Active



     [facility]  

 

 Onset: 2016  Paroxysmal atrial fibrillation  Ian Sandoval M.D., [facility],  
Active



     [facility]  

 

 Onset: 2018  Preoperative cardiovascular  Ian Sandoval M.D., [facility],  
Active



   examination  [facility]  

 

 Onset: 2018  Localized, primary osteoarthritis  Latasha Loya M.D.  
Active



   of the pelvic region and thigh    







Family History







 Date  Family Member(s)  Problem(s)  Comments

 

   General  Heart Disease  

 

   Children  One son , one living.  Son living in good health.







Social History







 Type  Date  Description  Comments

 

 Marital Status      

 

 Lives With    Spouse  

 

 Occupation    Physician's Assistant  Retired

 

 ETOH Use    Occasionally consumes alcohol  one or two drinks daily

 

 Recreational Drug Use    Denies Drug Use  

 

 Smoking    Patient is a former smoker  

 

 Daily Caffeine    Consumes on average 3 cups of  



     regular coffee per day  

 

 Daily Caffeine    consumes chocolate frequently  

 

 Exercise Type/Frequency    Exercises regularly  exercise class twice a



       week and walking the dog



       daily







Allergies, Adverse Reactions, Alerts







 Date  Description  Reaction  Status  Severity  Comments

 

 2013  Penicillins    active  Severe  

 

 2018  Statins  myalgias  active    

 

 2013  NKDA    inactive    







Medications







 Medication  Date  Status  Form  Strength  Qnty  SIG  Indications  Ordering



                 Provider

 

 Meloxicam    Active  Tablets  15mg  30tab  1 by mouth  M25.552  Latasha        s  every day sherry Loya M.D.

 

 Metoprolol    Active  Tablets  25mg  90tab  1/2 tab by    Ian



 Succinate ER  /    ER 24HR    s  mouth daily    TIA Sandoval,



                 [facility],



                 [facility]

 

 St Johns Wort    Active  Capsules  300mg    3 tabs daily    Other



 Extract  /2013              Ordering



                 Provider

 

 Paramjit Valentine    Active  Capsules  500mg    1080mg daily                  Ordering



                 Provider

 

 Allopurinol    Active  Tablets  300mg    1 by mouth    Unknown



   /0000          every day    

 

 Stiolto    Active  Aerosol  2.5-2.5mc    2 puff once    Unknown



 Respimat  0000      g/Act    daily    

 

 Vitamin C    Active            Unknown



   /0000              

 

 Naproxen    Active  Tablets  250mg  60tab  1 tablet by    Unknown



   /0000        s  mouth twice a    



             day as needed    



             pain, with    



             foods    

 

 Calcium    Active            Unknown



   /0000              

 

                 

 

 Pravastatin    Hx  Tablets  10mg  45tab  1 by mouth    Ian



 Sodium  /        s  every other    Jaime



   -          lashawn.    M.D.,



                 [facility]              [facility]

 

 Atorvastatin    Hx  Tablets  10mg  90tab  take 1 tablet    Ian



 Calcium  /        s  at bedtime    Monse Sandoval M.D.,



                 [facility]              [facility]

 

 Warfarin Sodium  07/28  Hx  Tablets  5mg  180ta  take 1-2 tabs  I48.0  Ian



           bs  by mouth    Jaime,



   -          daily as    M.D.,



             directed    [facility]              [facility]

 

 Atorvastatin    Hx  Tablets  20mg  90tab  take 1 tablet    Ian



 Calcium          s  at bedtime -    Jaime,



   -          Patient    M.D.,



             Stopped    [facility]          Taking This    [facility]



             Last Night    

 

 Metoprolol    Hx  Tablets  25mg    1 po qd    Mitzi



 Succinate ER      ER 24HR          Edna



   -              D.O.



                 

 

 Metoprolol    Hx  Tablets  25mg  90tab  1 po daily    Mitzi



 Succinate ER      ER 24HR    s      Monse Bishop              D.O.



                 

 

 Lisinopril    Hx  Tablets  10mg  90tab  1 by mouth    Ian



           s  every day    Monse Sandoval M.D.,



                 [facility]              [facility]

 

 Clopidogrel    Hx  Tablets  75mg  90tab  1 po qd            s      Edna



   -              D.O.



                 

 

 Atorvastatin    Hx  Tablets  10mg  90tab  1 po qd    Mitzi



 Calcium          s      Edna



   -              D.O.



                 

 

 Coumadin    Hx  Tablets  5mg  90tab  1 tablet by    Mitzi



           s  mouth daily    Edna,



   -          or as    D.O.



             directed    



                 

 

 Home Inr Mahine    Hx      1unit  Check inr    Mitzi



 And Test Strips          s  weekly/prn as    Edna



   -          directed    D.O.



                 

 

 Carvedilol    Hx  Tablets  6.25mg  180ta  1 po bid            bs      Ordering



   -              Provider



                 

 

 Lisinopril    Hx  Tablets  10mg  90tab  1 po qd    Other



           s      Ordering



   -              Provider



                 

 

 Atorvastatin    Hx  Tablets  10mg  90tab  1 po qd    Other



 Calcium          s      Ordering



   -              Provider



                 

 

 Spiriva    Hx  Capsules  18mcg  30cap  1 inhalation    Other



 Handihaler  /        s  po qam    Ordering



   -              Provider



                 

 

 Testosterone    Hx  Oil  200mg/ml  2unit  inject 1.0    Other



 Cypionate          s  ml once every    Ordering



   -          other week    Provider



                 

 

 Vitamin D-3    Hx  Tablets  400Unit  90tab  1 po qd            s      Ordering



   -              Provider



                 

 

 Coreg CR    Hx  Caps ER  10mg  30cap  1 capsule by        24HR    s  mouth daily    Monse Bishop              D.O.



                 

 

 Nitroglycerin    Hx  Tablets  0.4mg  25tab  1        Sub    s  sublingually    Edna,



   -          prn chest    D.O.



             pain    



                 

 

 Coumadin    Hx  Tablets  5mg  30tab  1 tablet by            s  mouth daily    Edna,



   -          or as    D.O.



                 

 

 Aspirin Low    Hx  Tablets  81mg  30tab  1 po qd    Mitzi



 Dose  /2013        s      Edna



   -              D.O.



                 

 

 Plavix    Hx  Tablets  75mg  90tab  1 po qd            denisse Bishop



   -              D.O.



                 

 

 Lisinopril    Hx  Tablets  5mg  90tab  1 by mouth    Ian



   /0000        s  every day    Monse Sandoval M.D.,



                 [facility],



                 [facility]







Immunizations







 CPT Code  Status  Date  Vaccine  Lot #

 

 [id number]  Given  10/01/2014  Influenza Virus 3Yrs &amp; Over  







Vital Signs







 Date  Vital  Result  Comment

 

 2018  Height  69.25 inches  5'9.25"









 Weight  225.00 lb  w/o shoes

 

 Heart Rate  62 /min  

 

 BP Systolic Sitting  142 mmHg  lue large cuff

 

 BP Diastolic Sitting  72 mmHg  lue large cuff

 

 BP Systolic Standing  162 mmHg  lue large cuff

 

 BP Diastolic Standing  68 mmHg  lue large cuff

 

 Respiratory Rate  18 /min  

 

 BMI (Body Mass Index)  33.0 kg/m2  

 

 Ejection Fraction  55-60%  echo 8/15/13









 2018  Height  69.25 inches  5'9.25"









 Weight  228.00 lb  w/ shoes

 

 Heart Rate  70 /min  

 

 BP Systolic Sitting  139 mmHg  lue large cuff

 

 BP Diastolic Sitting  68 mmHg  lue large cuff

 

 BP Systolic Standing  132 mmHg  lue large cuff

 

 BP Diastolic Standing  60 mmHg  lue large cuff

 

 Respiratory Rate  18 /min  

 

 BMI (Body Mass Index)  33.4 kg/m2  

 

 Ejection Fraction  55-60%  echo 8/15/13









 2018  Height  69.25 inches  5'9.25"









 Weight  225.00 lb  

 

 Heart Rate  64 /min  

 

 BP Systolic  158 mmHg  

 

 BP Diastolic  80 mmHg  

 

 BMI (Body Mass Index)  33.0 kg/m2  









 2016  Height  70 inches  5'10"









 Weight  219.00 lb  

 

 Heart Rate  66 /min  68

 

 BP Systolic Sitting  138 mmHg  right arm, reg cuff

 

 BP Diastolic Sitting  70 mmHg  right arm, reg cuff

 

 BP Systolic Standing  134 mmHg  right arm, reg cuff

 

 BP Diastolic Standing  68 mmHg  right arm, reg cuff

 

 Respiratory Rate  16 /min  

 

 BMI (Body Mass Index)  31.4 kg/m2  

 

 Ejection Fraction  55-60%  8/15/13









 2016  Height  70 inches  5'10"









 Weight  210.00 lb  

 

 Heart Rate  70 /min  72

 

 BP Systolic Sitting  106 mmHg  right arm, reg cuff

 

 BP Diastolic Sitting  64 mmHg  right arm, reg cuff

 

 BP Systolic Standing  100 mmHg  right arm, reg cuff

 

 BP Diastolic Standing  62 mmHg  right arm, reg cuff

 

 Respiratory Rate  16 /min  

 

 BMI (Body Mass Index)  30.1 kg/m2  

 

 Ejection Fraction  55-60%  8/15/13









 2015  Height  70 inches  5'10"









 Weight  207.00 lb  

 

 Heart Rate  74 /min  84

 

 BP Systolic  139 mmHg  Ra, Home BP cuff

 

 BP Diastolic  88 mmHg  Ra, Home BP cuff

 

 BP Systolic Sitting  128 mmHg  right arm, reg cuff

 

 BP Diastolic Sitting  78 mmHg  right arm, reg cuff

 

 BP Systolic Standing  120 mmHg  right arm, reg cuff

 

 BP Diastolic Standing  78 mmHg  right arm, reg cuff

 

 Respiratory Rate  16 /min  

 

 BMI (Body Mass Index)  29.7 kg/m2  

 

 Ejection Fraction  55-60%  8/15/13









 2015  Height  70 inches  5'10"









 Weight  208.00 lb  

 

 Heart Rate  68 /min  72

 

 BP Systolic Sitting  112 mmHg  left arm, reg cuff

 

 BP Diastolic Sitting  68 mmHg  left arm, reg cuff

 

 BP Systolic Standing  114 mmHg  left arm, reg cuff

 

 BP Diastolic Standing  68 mmHg  left arm, reg cuff

 

 Respiratory Rate  20 /min  

 

 BMI (Body Mass Index)  29.8 kg/m2  

 

 Ejection Fraction  55-60%  8/15/13









 2015  Height  70 inches  5'10"









 Weight  211.00 lb  w/o shoes

 

 Heart Rate  58 /min  standing- 58

 

 BP Systolic Sitting  158 mmHg  Ra, reg

 

 BP Diastolic Sitting  88 mmHg  Ra, reg

 

 BP Systolic Standing  150 mmHg  Ra, reg

 

 BP Diastolic Standing  80 mmHg  Ra, reg

 

 BMI (Body Mass Index)  30.3 kg/m2  

 

 Ejection Fraction  55-60%  8/15/13 ECHO









 10/01/2014  Height  70 inches  5'10"









 Weight  212.00 lb  

 

 Heart Rate  62 /min  70

 

 BP Systolic Sitting  144 mmHg  left arm, reg cuff

 

 BP Diastolic Sitting  90 mmHg  left arm, reg cuff

 

 BP Systolic Standing  134 mmHg  left arm, reg cuff

 

 BP Diastolic Standing  82 mmHg  left arm, reg cuff

 

 Respiratory Rate  14 /min  

 

 BMI (Body Mass Index)  30.4 kg/m2  









 2014  Height  70 inches  5'10"









 Weight  210.00 lb  with shoes

 

 Heart Rate  70 /min  64 sit and stand HR

 

 BP Systolic Sitting  130 mmHg  LA reg cuff

 

 BP Diastolic Sitting  82 mmHg  LA reg cuff

 

 BP Systolic Standing  126 mmHg  LA reg cuff

 

 BP Diastolic Standing  80 mmHg  LA reg cuff

 

 Respiratory Rate  16 /min  

 

 BMI (Body Mass Index)  30.1 kg/m2  









 2014  Height  70 inches  5'10"









 Weight  209.00 lb  

 

 Heart Rate  54 /min  60

 

 BP Systolic Sitting  134 mmHg  right arm, reg cuff

 

 BP Diastolic Sitting  80 mmHg  right arm, reg cuff

 

 BP Systolic Standing  122 mmHg  right arm, reg cuff

 

 BP Diastolic Standing  78 mmHg  right arm, reg cuff

 

 Respiratory Rate  16 /min  

 

 BMI (Body Mass Index)  30.0 kg/m2  









 2014  Height  72 inches  6'0"









 Weight  210.75 lb  

 

 Heart Rate  60 /min  

 

 BP Systolic Sitting  130 mmHg  

 

 BP Diastolic Sitting  72 mmHg  

 

 BMI (Body Mass Index)  28.6 kg/m2  









 2013  Height  72 inches  6'0"









 Weight  211.50 lb  

 

 Heart Rate  60 /min  Regular

 

 BP Systolic Sitting  130 mmHg  

 

 BP Diastolic Sitting  76 mmHg  

 

 BMI (Body Mass Index)  28.7 kg/m2  









 2013  Height  72 inches  6'0"









 Weight  207.00 lb  

 

 Heart Rate  60 /min  

 

 BP Systolic  122 mmHg  

 

 BP Diastolic  66 mmHg  

 

 BMI (Body Mass Index)  28.1 kg/m2  









 2013  Height  72 inches  6'0"









 Weight  208.00 lb  

 

 Heart Rate  60 /min  Irregular

 

 BP Systolic Sitting  110 mmHg  

 

 BP Diastolic Sitting  68 mmHg  

 

 BMI (Body Mass Index)  28.2 kg/m2  







Results







 Test  Date  Test  Result  H/L  Range  Note

 

 Basic Metabolic Panel  2016  Sodium  137 mmol/L    133-145  









 Potassium  4.7 mmol/L    3.5-5.0  

 

 Chloride  103 mmol/L    101-111  

 

 Co2 Carbon Dioxide  28 mmol/L    22-32  

 

 Anion Gap  6 mmol/L    2-11  

 

 Glucose  118 mg/dL  High    

 

 Creatinine  1.78 mg/dL  High  0.67-1.17  

 

 Calcium  9.9 mg/dL    8.6-10.3  

 

 Egfr Non-  36.9    &gt;60  

 

 Egfr   47.4    &gt;60  1

 

 Blood Urea Nitrogen  32 mg/dL  High  6-24  

 

 BUN/Creatinine Ratio  18.0    8-20  









 Laboratory test finding  2016  Alt (SGPT)  13 U/L    7-52  









 Ast (Sgot)  17 U/L    13-39  

 

 Uric Acid  6.5 mg/dL    4.4-7.6  









 Lipid Profile (Trig/Chol/HDL)  2016  Triglycerides  227 mg/dL      2









 Cholesterol  176 mg/dL      3

 

 HDL Cholesterol  36.4 mg/dL      4

 

 LDL Cholesterol  94 mg/dL      5









 Lipid Profile (Trig/Chol/HDL)  2014  Triglycerides  77 mg/dL      6, 7









 Cholesterol  127 mg/dL      6, 8

 

 HDL Cholesterol  42.9 mg/dL      6, 9

 

 LDL Cholesterol  69 mg/dL      6, 10









 Laboratory test finding  2014  Alt  13 U/L    7-52  6, 11









 Ast  17 U/L    13-39  6, 12









 Order  2014  Event Monitor  &lt;pending&gt;      

 

 Laboratory test finding  2013  Alt  14 U/L    14-54  









 Ast  17 U/L    12-42  









 Lipid Profile (Trig/Chol/HDL)  2013  Triglycerides  112 mg/dL      









 Cholesterol  175 mg/dL    Less than 200  

 

 HDL Cholesterol  43 mg/dL    40-60  13

 

 Cholesterol/HDL Ratio  4.1 Average    1-4.44  

 

 LDL Cholesterol  109.6  High  Less Than 100  14









 1  *******Because ethnic data is not always readily available,



   this report includes an eGFR for both -Americans and



   non- Americans.****



   The National Kidney Disease Education Program (NKDEP) does



   not endorse the use of the MDRD equation for patients that



   are not between the ages of 18 and 70, are pregnant, have



   extremes of body size, muscle mass, or nutritional status,



   or are non- or non-.



   According to the National Kidney Foundation, irrespective of



   diagnosis, the stage of the disease is based on the level of



   kidney function:



   Stage Description                      GFR(mL/min/1.73 m(2))



   1     Kidney damage with normal or decreased GFR       90



   2     Kidney damage with mild decrease in GFR          60-89



   3     Moderate decrease in GFR                         30-59



   4     Severe decrease in GFR                           15-29



   5     Kidney failure                       &lt;15 (or dialysis)

 

 2  Desirable &lt;150



   Borderline high 150-199



   High 200-499



   Very High &gt;500

 

 3  Desirable &lt;200



   Borderline high 200-239



   High &gt;239

 

 4  Low &lt;40



   Desirable: 40-60



   High: &gt;60

 

 5  Desirable: &lt;100 mg/dL



   Near Optimal: 100-129 mg/dL



   Borderline High: 130-159 mg/dL



   High: 160-189 mg/dL



   Very High: &gt;189 mg/dL

 

 6  FASTING

 

 7  Desirable &lt;150



   Borderline high 150-199



   High 200-499



   Very High &gt;500

 

 8  Desirable &lt;200



   Borderline high 200-239



   High &gt;239

 

 9  Low &lt;40



   Desirable: 40-60



   High: &gt;60

 

 10  Desirable &lt;100



   Near Optimal 100-129



   Borderline high 130-159



   High 160-189



   Very High &gt;189

 

 11  FASTING

 

 12  FASTING

 

 13  HDL Interpretation:



   Undesirable: High Risk:  Less than 40 mg/dL



   Desirable:  Low Risk:  Greater than 60 mg/dL

 

 14  LDL Interpretation:



   Low Risk Optimal Level:  LDL Less than 100 mg/dL



   Near or Above Optimal:  -129 mg/dL



   Borderline High Risk:  -159 mg/dL



   High Risk:  -189 mg/dL



   Very High Risk:  LDL Greater than 189 mg/dL







Procedures







 Date  CPT Code  Description  Status

 

 04/10/2018  28559  Treadmill Interp/Report Only  Completed

 

 04/10/2018  55353  Stress Test Supervsn W/Out I/R  Completed

 

 2018  51224  EKG Tracing &amp; Interpretation  Completed

 

 2017  62687  Destruction Of Benign Lesions Any Method 1-14 lesions  
Completed

 

 2017  32446  Dest Lesion Each Addl Lesion 2 Through 14 Each  Completed

 

 2017  96675  Destruction ALL Benign Or Premalignant Lesion (Other  
Completed



     Than [place]  

 

 2016  57033  EKG Tracing &amp; Interpretation  Completed

 

 2015  54198  Holter Monitor Review (24 hr)dr flores &amp; tevin  
Completed



     only  

 

 2015  98346  Treadmill Interp/Report Only  Completed

 

 2015  26467  Treadmill Interp/Report Only  Completed

 

 2015  36858  Stress Test Supervsn W/Out I/R  Completed

 

 2015  51295  Stress Test Supervsn W/Out I/R  Completed

 

 2015  40417  EKG Tracing &amp; Interpretation  Completed

 

 2015  52772  EKG Tracing &amp; Interpretation  Completed

 

 2014  47916  Cardiac Event Monitor  Completed

 

 2014  48764  Stress Test Supervsn W/Out I/R  Completed

 

 2014  34464  Treadmill Interp/Report Only  Completed

 

 2014  29226  EKG Tracing &amp; Interpretation  Completed

 

 2013  57533  EKG Tracing &amp; Interpretation  Completed

 

 2013  07202  EKG Tracing &amp; Interpretation  Completed

 

 2013  90461  EKG, Interpretation Only  Completed

 

 2013  17676  Left Heart Cath. Incl S/I Coronaries, Angio S/I V Gram  
Completed



     If Done  

 

 2013  50133  Cath PLMT&amp;NJX L Ventriculog Img S&amp;I  Completed

 

 2013  12823  EKG, Interpretation Only  Completed

 

 2013  70954  Ptca W/Intracor Stent  Completed

 

 2013  59764  Revascularization Acute Total/Subtotal Occlusion  Completed

 

 2013  39259  IV Rx Trancath Therapy(Nitro/Maria Del Rosario)  Completed

 

 08/15/2013  07049  Color Flow Doppler/Interp &amp; Reprt  Completed

 

 08/15/2013  37117  Pulse Wave/Continuous-Interp.RPT  Completed

 

 08/15/2013  83996  ECHO Transthorasic Realtime 2D W Doppler &amp; Color  
Completed



     Flow Hosp  

 

 08/15/2013  83093  EKG, Interpretation Only  Completed







Encounters







 Type  Date  Location  Provider  CPT E/M  Dx

 

 Office Visit  2018  [facility]  Ian Sandoval M.D.,  [address]  
I25.10



   10:40a  [facility]    









 E78.5

 

 R06.02









 Office Visit  2018  2:00p  [facility]  Latasha Loya M.D.  
[address]  M25.551



     [facility]      









 M25.552

 

 M16.0









 Office Visit  2017  3:00p  [facility]  Nagi Curry MD  [address]  
L03.818

 

 Office Visit  2017  2:00p  [facility]  Nagi Curry MD  [address]483  
I78.8









 L82.1

 

 B35.1

 

 B07.0

 

 R23.8

 

 Z78.9

 

 L57.0









 Office Visit  2016 11:20a  [facility] Iván Sandoval M.D.,  
78955  I48.0



     [facility], [facility]    









 I10

 

 I25.10

 

 E78.5









 Office Visit  2016  8:36a  [facility]  Jase Leos,  
[address]  R04.0



     Hospitalists  [facility]    









 I48.2

 

 J44.9

 

 I10









 Office Visit  2016  8:36a  [facility] [place] [facility]  [address]  
R04.0



     [facility]  [facility]    









 I48.2

 

 J44.9

 

 I10









 Office Visit  2016 10:00a  [facility] Iván Sandoval M.D.,  
[address]  I48.0



     [facility], [facility]    









 I10

 

 E78.5

 

 I25.9









 Office Visit  2015  2:20p  [facility] Iván Sandoval M.D.,  
80853  427.31



     [facility]    









 401.1

 

 272.4

 

 414.9









 Office Visit  2015  1:40p  [facility] Iván Sandoval M.D.,  
31157  427.31



     [facility]    









 401.1

 

 272.4

 

 414.9









 Office Visit  2015  9:17a  [facility] Jorge L Romo,  
92100  427.31



     Carmel WEBER    









 427.89









 Office Visit  2015  1:20p  [facility] Iván Sandoval M.D.,  
51088  414.9



     [facility]    









 272.4

 

 401.1









 Office Visit  10/01/2014  2:20p  [facility] Iván Sandoval M.D.,  
57302  427.31



     [facility]    









 401.1

 

 272.4

 

 414.9









 Office Visit  2014  3:00p  [facility] Iván Sandoval M.D.,  
53986  401.1



     [facility], [facility]    









 272.4

 

 414.01

 

 427.31









 Office Visit  2014  3:00p  [facility]  Ian Sandoval M.D.,  
45490  414.01



     [facility], [facility]    









 401.1

 

 272.4

 

 427.31









 Office Visit  2014 10:40a  [facility]  Mitzi Bishop D.O.  
34763  414.01









 401.1

 

 272.4









 Office Visit  2013 10:40a  [facility]  Mitzi Bishop D.O.  
68824  414.01









 V45.89

 

 401.1

 

 272.4









 Office Visit  2013  1:20p  [facility]  Mitzi Bishop D.O.  
18454  427.31









 414.01

 

 410.70

 

 401.1

 

 272.4









 Office Visit  2013  1:20p  [facility]  Mitzi Bishop D.O.  
30400  V45.89









 410.70

 

 414.01

 

 427.31

 

 401.1

 

 272.4









 Office Visit  2013  4:13p  [facility],  Yusuf Barkley M.D.  
64508  410.71



     Hospitalists      









 401.9









 Office Visit  2013  9:19a  [facility]  Ray Cedillo,  81272
  786.50



       M.D.    









 410.31

 

 401.1









 Office Visit  2013  4:12p  [facility],  Yusuf Barkley M.D.  
86365  410.71



     Hospitalists      









 401.9









 Office Visit  2013  9:50a  [facility]  Ray Cedillo,  46182
  427.31



       M.D.    









 427.32

 

 401.1









 Office Visit  08/15/2013  4:11p  [facility],  Yusuf Barkley M.D.  
38428  410.71



     Hospitalists      









 401.9









 Office Visit  08/15/2013  9:18a  [facility]  Ray Cedillo,  56139
  427.31



       M.D.    









 250.00









 Office Visit  2013  9:45a  [facility]  Janette Alvarez,  
30830  883.0



     Of [facility]    

 

 Office Visit  2013  9:15a  [facility]  Janette Alvarez,  
71776  883.0



     [facility]    







Plan of Care

Future Appointment(s):2018  7:30 am - Rao Rubio PA-C at [facility].2018  7:30 am - LISA Benavidez at [facility].2018  7:30 am - Latasha Loya M.D. at [facility].2018 10:00 am - Latasha Loya M.D. at [facility].2018 - Ian Sandoval M.D., [facility], [id number] Encounter 
for preprocedural cardiovascular examinationComments:Your Lexiscan stress test 
showed no significant problems with  ischemia and had normal left ventricular 
function.Follow up:1 yearRecommendations:You are an acceptable risk for surgery 
from a cardiac standpoint. Continue your medications before and after the 
surgery.

## 2018-04-24 NOTE — RAD
INDICATION: COPD



COMPARISON: Chest x-ray dated April 13, 2018

 

TECHNIQUE: Single AP portable view of the chest was obtained.



FINDINGS: 



Image quality is compromised due to the relative inferiority of a portable chest x-ray.



The heart and mediastinum exhibit normal size and contour.



The lungs are grossly clear. There is no evidence of a large pleural effusion.



Visualized bones are normal for the patient's age.



IMPRESSION:  No radiographic evidence for acute cardiopulmonary abnormality on this

portable chest x-ray.

## 2018-04-24 NOTE — RAD
INDICATION:  Status post total left hip replacement surgery.



TECHNIQUE: An AP view of the pelvis was obtained in the operating room.



FINDINGS:  The patient is undergoing a total left hip replacement surgery. The acetabular

prosthesis is in place. There is also a femoral prostheses template in place.  



IMPRESSION:  INTRAOPERATIVE CONTROL FILMS.

## 2018-04-24 NOTE — OP
OPERATIVE REPORT:

 

DATE OF OPERATION:  04/24/18

 

DATE OF BIRTH:  [birth year]

 

SURGEON:  Latasha Loya MD

 

ASSISTANT:  LISA Amezcua

 

Ms. Rodriguez did help throughout the procedure with preparation of the leg, wound retraction, manipul
ation of the hip and wound closure.

 

ANESTHESIOLOGIST:  Dr. Williamson.

 

ANESTHESIA:  Spinal.

 

PRE-OP DIAGNOSIS:  Severe end-stage degenerative osteoarthritis of the left hip joint.

 

POST-OP DIAGNOSIS:  Severe end-stage degenerative osteoarthritis of the left hip joint.

 

OPERATIVE PROCEDURE:  Left total hip arthroplasty.

 

HARDWARE USED:  This is uncemented total hip arthroplasty hardware.  For the cup, a Tritanium cluster
 hole shell, 56E, 2 bone screws were used of length 20 mm and 25 mm.  For the insert, a Trident X3 0-
degree polyethylene insert 40E.  For the stem, an Accolade TMZF size 5 with a 127-degree neck.  For t
he head, a Biolox delta ceramic V40 femoral head 40 +0.

 

COMPLICATIONS:  None.

 

ESTIMATED BLOOD LOSS:  200 cc.

 

SPECIMENS:  Femoral head and acetabular reaming sent to Pathology.

 

BRIEF HISTORY/INDICATIONS:  Mr. Tolliver is an 82-year-old male with years of increasingly severe lef
t hip pain.  Radiographs showed advanced arthritis.  He failed conservative treatment with anti-infla
mmatory, home exercise program and the ambulatory assistive devices.  Due to continued pain and decre
ased quality of life, the patient elected to undergo left total hip arthroplasty.

 

Informed consent was obtained from the patient.  He understood the risks of surgery included, but wer
e not limited to bleeding, infection, damage to nearby structures, continued pain, need for further s
urgery, intraoperative fracture, nerve palsy, hardware failure or loosening, dislocation, leg length 
discrepancy, stroke, heart attack, blood clot, and death.  He wished to proceed.

 

INTRAOPERATIVE FINDINGS:  Intraoperatively, the patient was noted to have severe arthritis with compl
ete loss of cartilage along the acetabulum and femoral head.

 

DESCRIPTION OF PROCEDURE:  Mr. Tolliver was identified in the preanesthesia unit. His left lower extr
emity was marked as the correct operative side.  Informed consent was signed and placed in the chart.
  The patient was taken to the operating room and placed under spinal anesthesia without difficulty. 
 His Ford catheter was placed.  He was placed in the right lateral decubitus position on the pegboar
d. All bony prominences were well padded.  Left lower extremity was prepped and draped in the usual s
terile fashion.  Preop time-out was made to correctly identify the patient's side and site.  Appropri
ate perioperative antibiotics were given within 1 hour of incision.

 

A 12 cm posterior hip incision was made with a 10 blade.  Electrocautery was used to dissect down to 
the lateral fascia layer.  The 10 blade was used to incise the lateral fascia layer in line with the 
skin incision.  Charnley retractor was placed.  The piriformis and conjoint tendons were identified. 
 These were elevated off the posterolateral femur using electrocautery.  These were tagged with #5 Et
hibond.  Next, electrocautery was used to make a standard posterolateral capsular flap.  This was als
o tagged with #5 Ethibond.  The hip was carefully dislocated. Lesser troch to center of the femoral h
ead measured 62 mm.  Oscillating saw was used to make the appropriate femoral neck cut.  The femoral 
head was carefully removed.  The femur was retracted anteriorly.  After appropriate placement of retr
actors, the acetabulum was easily visualized.  A long-handled knife was used to sharply remove any re
maining labrum from the acetabular rim.

 

The acetabulum was sequentially reamed up to a size 55.  Bleeding subchondral bone bed was obtained. 
 A 55 trial had excellent fit with appropriate anteversion and abduction angle.  Final implant chosen
 was a Tritanium cluster hole shell 56E. This was impacted into the acetabulum without difficulty.  T
here was excellent stability.  There was appropriate anteversion and abduction angle.  Two screws wer
e placed in the superoposterior quadrant for extra stability.  These were Torx access screws with the
 length of 20 mm and 25 mm.  The liner chosen was a Trident X3 0- degree polyethylene insert 40E.  Th
is was impacted into the acetabulum without difficulty.  Stability of the liner was checked and reche
cked and was noted to be stable.

 

Next, attention was turned to preparation of proximal femur.  A canal finder was used to enter the pr
oximal femur.  Proximal femur was sequentially broached up to a size 5.  Size 5 broach had excellent 
fit and appropriate anteversion.  A 127-degree neck trial and a 40 +0 head trial were placed.  Lesser
 troch to center of the femoral head measured 62 mm.  The hip was reduced and taken through a range o
f motion.  The hip was stable in all positions.  The hip was carefully dislocated. All trials were re
moved.  The final implant chosen was an Accolade TMZF size 5 with a 127-degree neck.  This was impact
ed into the femoral canal without difficulty. There was excellent stability and appropriate anteversi
on noted.  40 +0 Biolox delta ceramic V40 head was chosen.  This was impacted onto the femoral neck. 
 The hip was reduced and taken through a range of motion.  The hip was stable in all positions.  Soft
 tissue tension and leg length were deemed to be appropriate.  The hip was copiously irrigated with s
terile saline.  Previously tagged capsule and tendons were reapproximated to the posterolateral femur
 through 2 trochanteric drill holes.  The lateral fascial layer was closed using interrupted #1 Vicry
l. The rest of the incision was closed in a layered fashion using 0 and 2-0 Vicryl. The skin was clos
ed using 3-0 Monocryl and Dermabond.  Sterile Adaptic, 4x4s, and paper tape were placed over the inci
ofelia.

 

The patient's anesthesia was reversed without difficulty.  He was taken to the PACU in stable conditi
on.  Intended weightbearing will be weightbearing as tolerated. Intended DVT prophylaxis will be Coum
joy with a Lovenox bridge.

 

 [id number]/[facility] #: [id number]

## 2018-04-24 NOTE — CONS
CC:  Dr. Aguilera; Dr. Loya*

 

CONSULTATION REPORT:

 

DATE OF CONSULT:  18

 

PRIMARY CARE PROVIDER:  Dr. Aguilera.

 

ATTENDING PHYSICIAN WHILE IN THE HOSPITAL:  Essence Godwin MD (report being 
dictated by Mauro Espinoza NP)

 

REQUESTING PHYSICIAN FOR CONSULT:  Dr. Loya.

 

REASON FOR MEDICAL CONSULTATION:  Evaluation of medical  management of comorbid 
medical conditions.

 

HISTORY OF PRESENT ILLNESS:  Mr. Tolliver is an 82-year-old male patient who 
presented to Dr. Loya's service in the outpatient setting.  He has been having 
significant left hip pain.  He failed conservative therapy and he was brought 
into the hospital today for an elective total hip replacement.  The patient did 
have a perioperative risk stratification with Dr. Sandoval and Dr. Aguilera.  He 
had a stress test and then he was deemed medically optimized for surgery.  The 
patient does have a history of coronary artery disease, AFib, COPD, hypertension
, BPH, diabetes, hyperlipidemia and a history of an MI.  He was evaluated in 
the PACU.  He said he was feeling well.  He does admit to having a cough for 
which he has been taking guaifenesin for.  He denied feeling anymore short of 
breath in his baseline.  He denies having any abdominal pain.  There is no 
chest pain.  He denies any recent fevers or chills.  He said he does not feel 
nauseous.  He denies any lightheadedness.  He says there is pain in his hip, 
but he has no pain at this point, he says it is well controlled with the 
current pain management.  He denies feeling nauseous or lightheaded.  Because 
of his medical complexity, we were asked to evaluate in consult.

 

PAST MEDICAL HISTORY:  Significant for;

 

1.  CAD.

2.  AFib status post ablation.

3.  COPD.

4.  Hypertension.

5.  BPH.

6.  Diabetes.

7.  Hyperlipidemia.

8.  History of MI.

 

SURGICAL HISTORY:

1.  He has had cardiac ablation for AFib.

2.  Tonsillectomy.

3.  Appendectomy.

4.  Parotidectomy.

5.  Cataract extraction.

6.  Cardiac catheterization.

 

MEDICATIONS:  Home meds according to the preop list provided includes;

 

1.  Stiolto two puffs inhale q.p.m.

2.  [place]'s wort 1 tablet p.o. daily.

3.  Saw Palmetto 1080 mg p.o. at bedtime.

4.  Aleve 220 mg p.o. twice a day as needed.

5.  Mobic 15 mg p.o. daily as needed.

6.  Calcium carbonate 500 mg daily.

7.  Aspirin 81 mg at bedtime.

8.  Vitamin C 1000 mg p.o. at bedtime.

9.  Allopurinol 300 mg p.o. at bedtime.

10.  Metoprolol succinate 12.5 mg p.o. at bedtime.

 

ALLERGIES:  PENICILLIN.

 

FAMILY HISTORY:  His mother  at the age of 83, father had a history of 
dementia and MI.

 

SOCIAL HISTORY:  He is a former smoker, quit over 20 years ago.  He does not 
drink alcohol.  Surrogate decision maker is his wife and son.

 

REVIEW OF SYSTEMS:  There is no documented fevers.  He denies having any 
significant weight change.  There was no double vision.  He denies having any 
ear discharge.  There is no rhinorrhea.  He denies having any sore throat.  
There is no thyroid enlargement.  He denied having any chest pain.  There is no 
orthopnea, no nocturnal dyspnea.  Denies having any abdominal pain.  No nausea.
  No vomiting. There is no dysuria.  There is no frequency.  He denied having 
any seizure.  No pruritus, no skin ulcerations.  Review of 14 systems was 
completed, all others are negative.

 

PHYSICAL EXAM:  Vital Signs:  Blood pressure 151/70, pulse 64, respirations 19, 
O2 sat 96%, temperature 97.5.  General:  At this time, Mr. Tolliver is an 82-
year-old male patient.  He is sitting in the PACU bed.  He does not appear to 
be in any acute distress.  HEENT:  Head:  Atraumatic, normocephalic.  Eyes:  
EOMs are intact. Sclerae anicteric and not pale.  Neck:  Supple.  Throat:  Oral 
mucosa appears to be moist.  No oropharyngeal erythema.  Heart:  Sounds S1, S2.
  He is in a regular rate and rhythm.  No murmurs, rubs or gallops.  Lungs:  He 
does have rhonchi in the upper lobes bilaterally.  He had equal diaphragmatic 
expansion.  No wheezes or rales.  Abdomen was soft, it was flat.  Bowel sounds 
hypoactive but present. Extremities:  Pulses 2+ throughout.  Distal CSM checks 
were intact to bilateral lower extremities.  He was unable to move the 
extremities at this point as he is in a hip adductor pillow.  Upper extremities 
he had 5/5 strength.  Neurologically, he is awake, alert and oriented x3.  He 
had no gross focal deficits.  His skin is intact with exception to the left hip 
where he has an incision that is covered with an ABD dressing which is clean, 
dry and intact.

 

DIAGNOSTIC STUDIES/LAB DATA:  His labs which are preop.  WBC 7.1, RBC of 4.78, 
hemoglobin of 14.6, hematocrit 44, platelet count of 189.  INR 0.90.  Sodium 140
, potassium of 4.1, chloride 104, bicarb 27, BUN 23, creatinine 1.01, glucose 
129. Urine preop showed 1+ protein, trace leukocyte esterase, 2+ wbc, 1+ 
glucose. Microbiology grew out nothing.  He did have an EKG preop showing a 
normal sinus rhythm rate of 66, no ST-elevation or T-wave inversions.  He did 
have a preop stress test, was read as no ischemia.  Old medical records were 
reviewed.  He did have a preop chest x-ray as well on the , which revealed 
hyperinflation consistent with COPD no active cardiopulmonary disease.  Old 
medical records reviewed.

 

ASSESSMENT AND PLAN:  Mr. Tolliver is an 82-year-old male patient with multiple 
medical problems, coming into the orthopedic services today for elective left 
total hip.  We are asked to evaluate in consult.

 

RECOMMENDATIONS:  At this point;

 

1.  Status post left total hip.  We will defer the management to Dr. Loya and 
her team.

2.  Chronic obstructive pulmonary disease.  He does have rhonchi on exam.  I am 
going to get a chest x-ray and put him on p.r.n. nebs.  I have ordered flutter 
valve and incentive spirometry for aggressive pulmonary toileting.  Should he 
spike a fever or have low thresholds, to start antibiotic with nebulizers and 
possibly adding steroids should he develop wheezing but he is not, will need to 
follow this closely.

3.  Hypertension.  Continue meds prescribed.

4.  Coronary artery disease.  Continue his aspirin and beta-blocker therapy for 
now.  Further recommendations are per Dr. Sandoval

5.  Atrial fibrillation.  At this point, he is not on any blood thinners.  He 
does have a high SARAH-VASc score.  He is going to be going on warfarin for the 
total hip.  This could be started in the outpatient setting if this needs to be 
continued.  At this point, we will continue his aspirin after his warfarin is 
finished.

6.  Hyperlipidemia.  Continue current medical regimen.

7.  History of myocardial infarction.  Continue with secondary prevention.

8.  History of diabetes.  This is typically diet controlled in the setting of 
recent surgery.  I will go ahead and put him on a sliding scale and check his 
fingersticks more frequently.

9.  Benign prostatic hypertrophy.  Continue meds as prescribed.

10.  DVT prophylaxis, defer to the primary team.

11.  Code status, full code.

12.  Fluids, electrolytes and nutrition.  I would recommend a consistent carb 
diet.

 

TIME SPENT:  Time spent on consult was 60 minutes, greater than half the time 
was spent face to face with the patient obtaining my history and physical, the 
other half of the time was spent going over the plan of care with the patient 
and implementing the plan of care.  I did discuss the plan of care with my 
attending, Dr. Godwin, and she is in agreement.

 

____________________________________ 

MAURO ESPINOZA NP

 

[id number]/[facility] #: [id number]

JEF

## 2018-04-24 NOTE — RAD
HISTORY: Follow-up left hip arthroplasty



COMPARISONS: January 19, 2018



VIEWS: 3, Frontal view of the pelvis with frontal and frog-leg views of the left hip



FINDINGS:



BONE DENSITY: Normal.

BONES: The patient is status post left hip arthroplasty. There is no hardware failure or

osteolysis.    

JOINTS: The patient is status post left hip arthroplasty. There is mild osteoarthritis of

the right hip.    

ALIGNMENT: There is no dislocation. 

SOFT TISSUES: Unremarkable.



OTHER FINDINGS: None.



IMPRESSION: 

STATUS POST LEFT HIP ARTHROPLASTY

## 2018-04-25 LAB
HCT VFR BLD AUTO: 31 % (ref 42–52)
HGB BLD-MCNC: 10.5 G/DL (ref 14–18)
INR PPP/BLD: 1.02 (ref 0.77–1.02)
PLATELET # BLD AUTO: 159 10^3/UL (ref 150–450)

## 2018-04-25 RX ADMIN — INSULIN LISPRO SCH UNITS: 100 INJECTION, SOLUTION INTRAVENOUS; SUBCUTANEOUS at 08:23

## 2018-04-25 RX ADMIN — INSULIN LISPRO SCH UNITS: 100 INJECTION, SOLUTION INTRAVENOUS; SUBCUTANEOUS at 18:04

## 2018-04-25 RX ADMIN — ALLOPURINOL SCH MG: 300 TABLET ORAL at 17:43

## 2018-04-25 RX ADMIN — ENOXAPARIN SODIUM SCH MG: 40 INJECTION SUBCUTANEOUS at 12:21

## 2018-04-25 RX ADMIN — DOCUSATE SODIUM SCH MG: 100 CAPSULE, LIQUID FILLED ORAL at 20:58

## 2018-04-25 RX ADMIN — OXYCODONE HYDROCHLORIDE AND ACETAMINOPHEN PRN TAB: 5; 325 TABLET ORAL at 23:54

## 2018-04-25 RX ADMIN — OXYCODONE HYDROCHLORIDE AND ACETAMINOPHEN PRN TAB: 5; 325 TABLET ORAL at 12:21

## 2018-04-25 RX ADMIN — IBUPROFEN SCH MG: 600 TABLET, FILM COATED ORAL at 04:24

## 2018-04-25 RX ADMIN — MAGNESIUM HYDROXIDE SCH ML: 400 SUSPENSION ORAL at 09:25

## 2018-04-25 RX ADMIN — INSULIN LISPRO SCH UNITS: 100 INJECTION, SOLUTION INTRAVENOUS; SUBCUTANEOUS at 13:07

## 2018-04-25 RX ADMIN — IBUPROFEN SCH MG: 600 TABLET, FILM COATED ORAL at 11:22

## 2018-04-25 RX ADMIN — Medication SCH INH: at 17:44

## 2018-04-25 RX ADMIN — DOCUSATE SODIUM SCH MG: 100 CAPSULE, LIQUID FILLED ORAL at 09:24

## 2018-04-25 RX ADMIN — CEFTRIAXONE SODIUM SCH MLS/HR: 1 INJECTION, POWDER, FOR SOLUTION INTRAVENOUS at 16:09

## 2018-04-25 RX ADMIN — OXYCODONE HYDROCHLORIDE AND ACETAMINOPHEN PRN TAB: 5; 325 TABLET ORAL at 07:37

## 2018-04-25 RX ADMIN — MAGNESIUM HYDROXIDE SCH ML: 400 SUSPENSION ORAL at 20:58

## 2018-04-25 RX ADMIN — ASPIRIN SCH MG: 81 TABLET, COATED ORAL at 17:43

## 2018-04-25 RX ADMIN — METOPROLOL SUCCINATE SCH MG: 25 TABLET, EXTENDED RELEASE ORAL at 17:42

## 2018-04-25 RX ADMIN — IBUPROFEN SCH MG: 600 TABLET, FILM COATED ORAL at 00:20

## 2018-04-25 NOTE — PN
Subjective


Date of Service: 04/25/18


Interval History: 





Patient feeling well, No Pain in leg, worse with walking but bearable and 

improved with pain medication. Is urinating but had a clot in urine. Patient is 

passing flatus but no BM. Patient denies CP, SOB, Dizziness, N/V, abdominal pain

, F/C, palpitations, dysuria, or other pain. Patient is a retired PA and is 

concerned about UTI but had a negative culture. 


Family History: Unchanged from Admission


Social History: Unchanged from Admission


Past Medical History: Unchanged from Admission





Objective


Active Medications: 








Acetaminophen (Tylenol Tab*)  650 mg PO Q4H PRN


   PRN Reason: PAIN OR TEMPERATURE


Albuterol (Ventolin 2.5 Mg/3 Ml Neb.Sol*)  2.5 mg INH Q2H PRN


   PRN Reason: SOB/WHEEZING


Allopurinol (Zyloprim Tab*)  300 mg PO QPM [facility]


   Last Admin: 04/24/18 17:16 Dose:  300 mg


Aspirin (Aspirin Ec Tab*)  81 mg PO QPM [facility]


   Last Admin: 04/24/18 17:16 Dose:  81 mg


Bisacodyl (Dulcolax Supp*)  10 mg NH DAILY PRN


   PRN Reason: constipation


Cyclobenzaprine HCl (Flexeril Tab*)  10 mg PO TID PRN


   PRN Reason: SPASMS


Dextrose (D50w Syringe 50 Ml*)  12.5 gm IV PUSH .FOR FS < 60 - SS PRN


   PRN Reason: FS < 60


Diphenhydramine HCl (Benadryl Iv*)  12.5 mg IV Q6H PRN


   PRN Reason: PRURITIS


Docusate Sodium (Colace Cap*)  100 mg PO BID [facility]


   Last Admin: 04/25/18 09:24 Dose:  100 mg


Enoxaparin Sodium (Lovenox(*))  40 mg SUBCUT Q24H [facility]


Lactated Ringer's (Lactated Ringers 1000 Ml Bag*)  1,000 mls @ 100 mls/hr IV 

PER RATE [facility]


   Last Admin: 04/25/18 00:18 Dose:  100 mls/hr


Insulin Human Lispro (Humalog*)  0 units SUBCUT AC [facility]


   PRN Reason: Protocol


   Last Admin: 04/25/18 08:23 Dose:  6 units


Lactulose (Lactulose*)  30 ml PO Q6H PRN


   PRN Reason: constipation


Magnesium Hydroxide (Milk Of Magnmyah Rebollarq*)  30 ml PO BID [facility]


   Last Admin: 04/25/18 09:25 Dose:  30 ml


Magnesium Hydroxide (Milk Of Magnesia Liq*)  30 ml PO Q6H PRN


   PRN Reason: constipation


Metoprolol Succinate (Toprol Xl Tab*)  12.5 mg PO QPM [facility]


   Last Admin: 04/24/18 17:17 Dose:  12.5 mg


Morphine Sulfate (Morphine Vial*)  2 mg IV Q2H PRN


   PRN Reason: PAIN


Pto: Tiotropium Br/Olodaterol Hcl [ Stiolto Respimat Inhal Spray]  2 inh INH 

QPM [facility]


   Last Admin: 04/24/18 17:20 Dose:  2 inh


Ondansetron HCl (Zofran Inj*)  4 mg IV Q6H PRN


   PRN Reason: nausea


Ondansetron HCl (Zofran Tab*)  4 mg PO Q6H PRN


   PRN Reason: NAUSEA


Oxycodone HCl (Roxycodone Tab*)  10 mg PO Q4H PRN


   PRN Reason: SEVERE PAIN


Oxycodone/Acetaminophen (Percocet 5/325 Tab*)  2 tab PO Q4H PRN


   PRN Reason: PAIN


Oxycodone/Acetaminophen (Percocet 5/325 Tab*)  1 tab PO Q4H PRN


   PRN Reason: PAIN


   Last Admin: 04/25/18 07:37 Dose:  1 tab


Pharmacy Profile Note (Coumadin Daily Reminder*)  1 note FOLLOW UP 1700 [facility]


   Last Admin: 04/24/18 17:18 Dose:  1 note


Polyethylene Glycol/Electrolytes (Miralax*)  17 gm PO DAILY PRN


   PRN Reason: Constipation








 Vital Signs - 8 hr











  04/25/18 04/25/18 04/25/18





  07:37 07:42 08:00


 


Temperature  97.9 F 


 


Pulse Rate  70 


 


Respiratory 16 17 18





Rate   


 


Blood Pressure  123/48 





(mmHg)   


 


O2 Sat by Pulse  95 95





Oximetry   














  04/25/18 04/25/18





  11:15 11:22


 


Temperature 98.2 F 


 


Pulse Rate 74 


 


Respiratory 17 18





Rate  


 


Blood Pressure 117/52 





(mmHg)  


 


O2 Sat by Pulse 94 





Oximetry  











Oxygen Devices in Use Now: None


Appearance: Patient is an 83yo male who appears stated age and is sitting in 

the bed in NAD.


Eyes: No Scleral Icterus, PERRLA


Ears/Nose/Mouth/Throat: NL Teeth, Lips, Gums, Clear Oropharnyx, Mucous 

Membranes Moist


Neck: NL Appearance and Movements; NL JVP, Trachea Midline


Respiratory: Symmetrical Chest Expansion and Respiratory Effort, Clear to 

Auscultation


Cardiovascular: NL Sounds; No Murmurs; No JVD, RRR, No Edema


Abdominal: NL Sounds; No Tenderness; No Distention, No Hepatosplenomegaly


Lymphatic: No Cervical Adenopathy


Extremities: No Edema


Skin: No Nodules or Sclerosis, - - Significant burdon of keratosis. Hip covered 

with CDI bulky dressing. 


Neurological: Alert and Oriented x 3, NL Sensation, NL Muscle Strength and Tone


Result Diagrams: 


 04/25/18 06:14





 04/25/18 06:14





Assess/Plan/Problems-Billing


Assessment: 





Patient is an 83yo male with a PMH for DM, MI, CAD, COPD, Afib, who is S/P left 

total hip replacement and is doing well except for concern about hematuria. 





- Patient Problems


(1) Post-operative state


Current Visit: Yes   Status: Acute   Code(s): Z98.890 - OTHER SPECIFIED 

POSTPROCEDURAL STATES   SNOMED Code(s): 49983880


   Comment: 


Management per ortho. Patient is urinating, passing flatus. H/H mildly 

decreased. PT/OT. Pain control good.   





(2) CAD (coronary artery disease)


Current Visit: Yes   Status: Acute   Code(s): I25.10 - ATHSCL HEART DISEASE OF 

NATIVE CORONARY ARTERY W/O ANG PCTRS   SNOMED Code(s): 48398834


   Comment: 


No CP, continue ASA and BB. 


Would recommend against long term use of NSAIDS.   





(3) COPD (chronic obstructive pulmonary disease)


Current Visit: Yes   Status: Acute   Code(s): J44.9 - CHRONIC OBSTRUCTIVE 

PULMONARY DISEASE, UNSPECIFIED   SNOMED Code(s): 82822300


   Comment: 


Rhonchi from yesterday resolved. Continue pulmonary toilet. Inhalers PRN. Not 

in exacerbation.   





(4) Afib


Current Visit: Yes   Status: Acute   Code(s): I48.91 - UNSPECIFIED ATRIAL 

FIBRILLATION   SNOMED Code(s): 03422275


   Comment: 


Rate controlled on Metoprolol. Would recommend anticoagulation to continue 

after post operative DVT prophylaxis.    





(5) HTN (hypertension)


Current Visit: Yes   Status: Acute   Code(s): I10 - ESSENTIAL (PRIMARY) 

HYPERTENSION   SNOMED Code(s): 89140873


   Comment: 


Normotensive, continue metoprolol.    





(6) HLD (hyperlipidemia)


Current Visit: Yes   Status: Acute   Code(s): E78.5 - HYPERLIPIDEMIA, 

UNSPECIFIED   SNOMED Code(s): 89376558


   Comment: 


Not on Medication.   





(7) Diabetes


Current Visit: Yes   Status: Acute   Code(s): E11.9 - TYPE 2 DIABETES MELLITUS 

WITHOUT COMPLICATIONS   SNOMED Code(s): 93710060


   Comment: 


Diet Controlled. SSI inpatient. Per patient HbA1c recently 7.2 which he and is 

PCP are happy with. Needs more strict BG control in postoperative period.    





(8) Hematuria


Current Visit: Yes   Status: Acute   Code(s): R31.9 - HEMATURIA, UNSPECIFIED   

SNOMED Code(s): 37768403


   Comment: 


Blood clot in urine after catheter removal. Likely due to BPH and traumatic 

insertion. Repeat UA. Preoperative UA only showed trace blood.    





(9) DVT prophylaxis


Current Visit: Yes   Status: Acute   Code(s): LPO5064 -    SNOMED Code(s): 

701497662


   Comment: 


Lovenox to Warfarin Per Ortho   





(10) Full code status


Current Visit: Yes   Status: Acute   Code(s): Z78.9 - OTHER SPECIFIED HEALTH 

STATUS   SNOMED Code(s): 919440672


   


Status and Disposition: 





Inpatient, Disposition per ortho.

## 2018-04-26 VITALS — DIASTOLIC BLOOD PRESSURE: 46 MMHG | SYSTOLIC BLOOD PRESSURE: 142 MMHG

## 2018-04-26 LAB
HCT VFR BLD AUTO: 32 % (ref 42–52)
HGB BLD-MCNC: 10.8 G/DL (ref 14–18)
INR PPP/BLD: 1.05 (ref 0.77–1.02)
PLATELET # BLD AUTO: 165 10^3/UL (ref 150–450)

## 2018-04-26 RX ADMIN — DOCUSATE SODIUM SCH MG: 100 CAPSULE, LIQUID FILLED ORAL at 09:02

## 2018-04-26 RX ADMIN — MAGNESIUM HYDROXIDE SCH: 400 SUSPENSION ORAL at 10:29

## 2018-04-26 RX ADMIN — OXYCODONE HYDROCHLORIDE AND ACETAMINOPHEN PRN TAB: 5; 325 TABLET ORAL at 12:19

## 2018-04-26 RX ADMIN — ENOXAPARIN SODIUM SCH MG: 40 INJECTION SUBCUTANEOUS at 12:11

## 2018-04-26 RX ADMIN — OXYCODONE HYDROCHLORIDE AND ACETAMINOPHEN PRN TAB: 5; 325 TABLET ORAL at 07:19

## 2018-04-26 RX ADMIN — CEFTRIAXONE SODIUM SCH MLS/HR: 1 INJECTION, POWDER, FOR SOLUTION INTRAVENOUS at 14:28

## 2018-04-26 RX ADMIN — INSULIN LISPRO SCH UNITS: 100 INJECTION, SOLUTION INTRAVENOUS; SUBCUTANEOUS at 08:24

## 2018-04-26 RX ADMIN — INSULIN LISPRO SCH UNITS: 100 INJECTION, SOLUTION INTRAVENOUS; SUBCUTANEOUS at 12:43

## 2018-04-26 NOTE — PN
Progress Note





- Progress Note


Date of Service: 04/26/18


SOAP: 


Subjective:


[]Patient seen at bedside. He feels well, left hip pain is well controlled. He 

denies dysuria, abdominal or suprapubic pain. Confirms dark coloration of urine 

and presence of clots has resolved. He has been treated for UTI with 

ceftriaxone. 








Objective:


[]


 Vital Signs











Temp  97.6 F   04/26/18 11:18


 


Pulse  77   04/26/18 11:18


 


Resp  18   04/26/18 11:18


 


BP  142/46   04/26/18 11:18


 


Pulse Ox  96   04/26/18 11:18








 Intake & Output











 04/25/18 04/26/18 04/26/18





 18:59 06:59 18:59


 


Intake Total 1967 2140 220


 


Output Total 1050 1425 400


 


Balance 917 715 -180


 


Intake:   


 


  IV Fluids 1000  


 


    LR 1000  


 


  IVPB 162  


 


      


 


  Oral 805 2140 220


 


Output:   


 


  Urine 1050 1425 400


 


Other:   


 


  # Bowel Movements  0 1


 


  Estimated Stool Amount   Large








 Laboratory Last Values











Hgb  10.8 g/dl (14.0-18.0)  L  04/26/18  05:54    


 


Hct  32 % (42-52)  L  04/26/18  05:54    


 


Plt Count  165 10^3/ul (150-450)   04/26/18  05:54    


 


MPV  10.0 um3 (7.4-10.4)   04/26/18  05:54    


 


INR (Anticoag Therapy)  1.05  (0.77-1.02)  H  04/26/18  05:54    


 


Sodium  136 mmol/L (139-145)  L  04/25/18  06:14    


 


Potassium  4.4 mmol/L (3.5-5.0)   04/25/18  06:14    


 


Chloride  103 mmol/L (101-111)   04/25/18  06:14    


 


Carbon Dioxide  26 mmol/L (22-32)   04/25/18  06:14    


 


Anion Gap  7 mmol/L (2-11)   04/25/18  06:14    


 


BUN  27 mg/dL (6-24)  H  04/25/18  06:14    


 


Creatinine  1.21 mg/dL (0.67-1.17)  H  04/25/18  06:14    


 


Est GFR ( Amer)  73.8  (>60)   04/25/18  06:14    


 


Est GFR (Non-Af Amer)  57.4  (>60)   04/25/18  06:14    


 


BUN/Creatinine Ratio  22.3  (8-20)  H  04/25/18  06:14    


 


Glucose  241 mg/dL ()  H  04/25/18  06:14    


 


POC Glucose (mg/dL)  234 mg/dL ()  H  04/26/18  07:22    


 


Hemoglobin A1c  8.1 % (4.0-5.6)  H  04/25/18  06:14    


 


Calcium  8.5 mg/dL (8.6-10.3)  L  04/25/18  06:14    


 


Urine Color  Yellow   04/25/18  12:30    


 


Urine Appearance  Clear   04/25/18  12:30    


 


Urine pH  5.0  (5-9)   04/25/18  12:30    


 


Ur Specific Gravity  1.017  (1.010-1.030)   04/25/18  12:30    


 


Urine Protein  1+(30 mg/dl)  (Negative)  A  04/25/18  12:30    


 


Urine Ketones  Negative  (Negative)   04/25/18  12:30    


 


Urine Blood  3+  (Negative)  A  04/25/18  12:30    


 


Urine Nitrate  Negative  (Negative)   04/25/18  12:30    


 


Urine Bilirubin  Negative  (Negative)   04/25/18  12:30    


 


Urine Urobilinogen  Negative  (Negative)   04/25/18  12:30    


 


Ur Leukocyte Esterase  2+  (Negative)  A  04/25/18  12:30    


 


Urine WBC (Auto)  3+(>20/hpf)  (Absent)  A  04/25/18  12:30    


 


Urine RBC (Auto)  3+(>10/hpf)  (Absent)  A  04/25/18  12:30    


 


Ur Squamous Epith Cells  Present  (Absent)  A  04/25/18  12:30    


 


Urine Bacteria  Absent  (Absent)   04/25/18  12:30    


 


Hyaline Casts  Present  (Absent)  A  04/25/18  12:30    


 


Urine Glucose  1+(50 mg/dl)  (Negative)  A  04/25/18  12:30    











General: Well appearing, NAD


LLE: Dressing changed. Incision CDI without surrounding erythema. DF/PF intact. 

Sensation intact and capillary refill less than two seconds distally. DP 2+.


BL LE: Calves supple and nontender without erythema, edema or palpable cords 





Assessment:


[]POD 2 s/p left total hip arthroplasty





Plan:


[]WBAT


PT/OT


Discussed appropriate hip precautions, reviewed importance. 


Lovenox prior to discharge, coumadin 6 mg 


Today's dose of ceftriaxone prior to DC. If urine culture reveals infection 

will continue tx outpatient


Discharge home today. Hospitalist aware and in agreement to DC home today.

## 2018-04-26 NOTE — DS
DISCHARGE SUMMARY:

 

DATE OF ADMISSION:  The patient is admitted to [facility] on 04/24/
18.

 

DATE OF DISCHARGE:  04/26/18.

 

SURGEON:  Latasha Loya MD* (dictated by LISA Palomino).

 

ASSISTANT:  LISA Amezcua

 

PRE-OP DIAGNOSIS:  Severe end-stage degenerative osteoarthritis of the left hip 
joint.

 

OPERATIVE PROCEDURE:  Left total hip arthroplasty.

 

HISTORY:  Mr. Tolliver is an 82-year-old male with years of increasingly severe 
left hip pain.  Radiographs showed bone-on-bone arthritis.  He failed 
conservative management and elected to undergo left total hip arthroplasty.

 

HOSPITAL COURSE:  Mr. Tolliver was admitted to [facility] on 04/24/
18. He underwent a left total hip arthroplasty without complication.  He 
recovered briefly in the PACU and then was taken to the short-stay surgical 
unit in stable condition.  He was also seen by a hospitalist service during his 
stay.  Of note, he did have a blood clot in his urine as well as bright red 
urine on his operative day, which progressed to kenan colored urine and then 
clear colored urine prior to discharge.  Urinalysis showed 1+ protein, 3+ blood
, 2+ leuk esterase, 3+ white blood cells, 3+ rbc's, hyaline casts, and urine 
glucose.  There was no bacteria and there was no nitrite in his urine.  He 
denied any dysuria.  The patient does have a history of B[facility]  On postop day 1, he is well appearing, in no acute distress.  
Dressing is clean, dry, and intact without surrounding erythema.  Sensation 
intact.  Capillary refill less than 2 seconds distally.  Dorsalis pedis pulse 2+
.  Dorsiflexion and plantar flexion intact.  On postop day 2, the patient is 
well appearing.  Dressing was changed.  Incision clean, dry, and intact.  
Dorsiflexion and plantar flexion intact.  2+ dorsalis pedis pulse.  Hemoglobin 
10.8, hematocrit 32, INR 1.05.  Vitals:  Temp 97.6, pulse 77, respiratory rate 
18, blood pressure 142/46, and pulse ox 96%.  Due to likely urinary tract 
infection, the patient was placed on ceftriaxone.  He was not discharged on any 
antibiotic as his urine culture came back negative.  He has been asked to 
follow up with his primary care provider or Urology regarding BPH.

 

DISCHARGE MEDICATIONS:

1.  Allopurinol 300 mg p.o. q.p.m.

2.  Saw palmetto 1080 p.o. q.p.m.

3.  Bieber's wort 100 p.o. q.p.m.

4.  Metoprolol 12.5 mg p.o. q.p.m.

5.  Meloxicam 15 mg p.o. daily p.r.n.

6.  Calcium carbonate 500 mg p.o. daily.

7.  Ascorbic acid 1000 mg p.o. q.p.m.

8.  Aspirin 81 mg q.p.m.

9.  Stiolto Respimat 4 g 2 inhalations q.p.m.

10.  Guaifenesin 1200 mg tabs ER q.12 hours.

11.  Acetaminophen 650 mg p.o. q.4 hours p.r.n.

12.  Docusate 100 mg p.o. b.i.d.

13.  Percocet 5/325 one to two tabs every 4 to 6 hours p.r.n., max daily dose 
of 10.

14.  Warfarin 2 mg tablet, take 1 to 3 tabs daily depending on INR dosing.

 

DISCHARGE PLAN:  The patient will be weightbearing as tolerated.  He will 
continue hip precautions.  Okay to shower postop day 3.  He will not submerge 
the wound. Visiting home nurse will do wound checks and blood draws for INRs on 
Mondays and Thursdays.  Last INR was 1.05 and Coumadin dosing is 6 mg on 04/26/
18, 4 mg on 04/27/18, 2 mg on 04/28/18 and 04/29/18.  We will recheck INR on 04/
30/18.  Pain control with p.o. Percocet 5/325 one to two tabs every 4 to 6 
hours as needed for pain, max daily dose of 10 tabs per day.  Follow up with 
Dr. Loya in 10 to 14 days.  Please followup with your primary care outpatient 
to discuss blood in urine after surgery.  Watch to a healthcare provider any 
symptoms of burning with urination, urinary urgency, urinary frequency, or any 
other change in urination.

 

____________________________________ 

LISA RAMOS

 

[id number]/CPS #: [id number]

MTDD

## 2018-04-26 NOTE — PN
Subjective


Date of Service: 04/26/18


Interval History: 





Mr. Tolliver reports that he is feeling quite well and is eager for discharge 

to home.





Family History: Unchanged from Admission


Social History: Unchanged from Admission


Past Medical History: Unchanged from Admission





Objective


Active Medications: 





Acetaminophen (Tylenol Tab*)  650 mg PO Q4H PRN


Albuterol (Ventolin 2.5 Mg/3 Ml Neb.Sol*)  2.5 mg INH Q2H PRN


Allopurinol (Zyloprim Tab*)  300 mg PO QPM ALEX


Aspirin (Aspirin Ec Tab*)  81 mg PO QPM ALEX


Bisacodyl (Dulcolax Supp*)  10 mg NV DAILY PRN


Cyclobenzaprine HCl (Flexeril Tab*)  10 mg PO TID PRN


Dextrose (D50w Syringe 50 Ml*)  12.5 gm IV PUSH .FOR FS < 60 - SS PRN


Diphenhydramine HCl (Benadryl Iv*)  12.5 mg IV Q6H PRN


Docusate Sodium (Colace Cap*)  100 mg PO BID ALEX


Enoxaparin Sodium (Lovenox(*))  40 mg SUBCUT Q24H ALEX


Lactated Ringer's (Lactated Ringers 1000 Ml Bag*)  1,000 mls @ 100 mls/hr IV 

PER RATE ALEX


Ceftriaxone Sodium 1 gm/ (Sodium Chloride)  50 mls @ 200 mls/hr IVPB Q24H ALEX


Insulin Human Lispro (Humalog*)  0 units SUBCUT AC ALEX


Lactulose (Lactulose*)  30 ml PO Q6H PRN


Magnesium Hydroxide (Milk Of Magnesia Liq*)  30 ml PO BID ALEX


Magnesium Hydroxide (Milk Of Magnesia Liq*)  30 ml PO Q6H PRN


Metoprolol Succinate (Toprol Xl Tab*)  12.5 mg PO QPM ALEX


Morphine Sulfate (Morphine Vial*)  2 mg IV Q2H PRN


Pto: Tiotropium Br/Olodaterol Hcl [ Stiolto Respimat Inhal Spray]  2 inh INH 

QPM ALEX


Ondansetron HCl (Zofran Inj*)  4 mg IV Q6H PRN


Ondansetron HCl (Zofran Tab*)  4 mg PO Q6H PRN


Oxycodone HCl (Roxycodone Tab*)  10 mg PO Q4H PRN


Oxycodone/Acetaminophen (Percocet 5/325 Tab*)  2 tab PO Q4H PRN


Oxycodone/Acetaminophen (Percocet 5/325 Tab*)  1 tab PO Q4H PRN


Pharmacy Profile Note (Coumadin Daily Reminder*)  1 note FOLLOW UP 1700 ALEX


Polyethylene Glycol/Electrolytes (Miralax*)  17 gm PO DAILY PRN





Vital Signs:











Temp Pulse Resp BP Pulse Ox


 


 97.6 F   77   18   142/46   96 


 


 04/26/18 11:18  04/26/18 11:18  04/26/18 11:18  04/26/18 11:18  04/26/18 11:18











Oxygen Devices in Use Now: None


Appearance: Male sitting up in chair in NAD


Eyes: No Scleral Icterus


Ears/Nose/Mouth/Throat: Mucous Membranes Moist


Neck: Trachea Midline


Respiratory: Symmetrical Chest Expansion and Respiratory Effort, Clear to 

Auscultation


Cardiovascular: NL Sounds; No Murmurs; No JVD, No Edema


Abdominal: NL Sounds; No Tenderness; No Distention


Extremities: No Edema


Skin: No Rash or Ulcers


Neurological: Alert and Oriented x 3, NL Muscle Strength and Tone


Nutrition: Taking PO's


Result Diagrams: 


 04/26/18 05:54





 04/25/18 06:14





Assess/Plan/Problems-Billing


Assessment: 





Mr. Tolliver is an 83yo male with a PMH for DM, MI, CAD, COPD, Afib, and BPH 

who is S/P left total hip replacement and is doing well except for concern 

about hematuria after traumatic shukla insertion in OR. 





- Patient Problems


(1) Post-operative state


Comment: 


- Management per ortho. 


- H/H mildly decreased but stable. 


- Continue PT/OT. 


- Pain adequately controlled.   





(2) Hematuria


Comment: 


- Blood clot in urine after catheter removal. Likely due to BPH and traumatic 

insertion. 


- Urine culture pending but low likelihood infection.  Will have received 2 

doses of ceftriaxone.     





(3) Afib


Comment: 


- Rate controlled on metoprolol. 


- Patient reports that he is not on anticoagulation as he has not had afib 

since his ablation about 3 years ago.  Follow with Dr. Sandoval.   





(4) CAD (coronary artery disease)


Comment: 


- Asymptomatic.


- Continue ASA and metoprolol.    





(5) COPD (chronic obstructive pulmonary disease)


Comment: 


- No evidence of acute exacerbation. 


- Continue pulmonary toilet. Inhalers PRN.    





(6) Diabetes


Comment: 


- Diet Controlled. Per patient HbA1c recently 7.2 which he and is PCP are happy 

with at age 82.    





(7) HTN (hypertension)


Comment: 


- Normotensive, continue metoprolol.    





(8) DVT prophylaxis


Comment: 


Lovenox to Warfarin Per Ortho   





(9) Full code status


Comment: 


   


Status and Disposition: 





Inpatient, Disposition per ortho.  Hospital Medicine will sign off for now, but 

please do not hesitate to call with further questions or concerns.  Anticipated 

discharge this afternoon or in AM.

## 2019-10-16 NOTE — PN
Progress Note





- Progress Note


Date of Service: 04/25/18


SOAP: 


Subjective:


[]Patient seen OOB in chair. He feels well with well controlled left hip pain. 

Denies chest pain, shortness of breath, dizziness, leg numbness, nausea or 

chills. Denies dysuria, confirms BPH, had blood clot in urine. Urine was red 

last night, now kenan. Fodr has been removed and he is urinating without 

difficulty. At home he takes saw palmetto which he desires to resume in the 

hospital. 





Objective:


[]


 Vital Signs











Temp  98.2 F   04/25/18 11:15


 


Pulse  74   04/25/18 11:15


 


Resp  16   04/25/18 12:21


 


BP  117/52   04/25/18 11:15


 


Pulse Ox  94   04/25/18 11:15








 Intake & Output











 04/24/18 04/25/18 04/25/18





 18:59 06:59 18:59


 


Intake Total 1125 2580 1427


 


Output Total 200 950 450


 


Balance 925 1630 977


 


Weight 227 lb  


 


Intake:   


 


  IV Fluids 1100 980 980


 


    LR 1100 980 980


 


  IVPB   107


 


    LR   107


 


  Oral 25 1600 340


 


Output:   


 


  Urine   450


 


  Ford 200 950 


 


Other:   


 


  # Bowel Movements  0 








 Laboratory Last Values











Hgb  10.5 g/dl (14.0-18.0)  L  04/25/18  06:14    


 


Hct  31 % (42-52)  L  04/25/18  06:14    


 


Plt Count  159 10^3/ul (150-450)   04/25/18  06:14    


 


MPV  10.0 um3 (7.4-10.4)   04/25/18  06:14    


 


INR (Anticoag Therapy)  1.02  (0.77-1.02)   04/25/18  06:14    


 


Sodium  136 mmol/L (139-145)  L  04/25/18  06:14    


 


Potassium  4.4 mmol/L (3.5-5.0)   04/25/18  06:14    


 


Chloride  103 mmol/L (101-111)   04/25/18  06:14    


 


Carbon Dioxide  26 mmol/L (22-32)   04/25/18  06:14    


 


Anion Gap  7 mmol/L (2-11)   04/25/18  06:14    


 


BUN  27 mg/dL (6-24)  H  04/25/18  06:14    


 


Creatinine  1.21 mg/dL (0.67-1.17)  H  04/25/18  06:14    


 


Est GFR ( Amer)  73.8  (>60)   04/25/18  06:14    


 


Est GFR (Non-Af Amer)  57.4  (>60)   04/25/18  06:14    


 


BUN/Creatinine Ratio  22.3  (8-20)  H  04/25/18  06:14    


 


Glucose  241 mg/dL ()  H  04/25/18  06:14    


 


POC Glucose (mg/dL)  241 mg/dL ()  H  04/25/18  07:29    


 


Calcium  8.5 mg/dL (8.6-10.3)  L  04/25/18  06:14    











General: Well appearing, NAD


LLE: Dressing CDI without surrounding erythema. DF/PF intact. Sensation intact 

and capillary refill less than two seconds distally. DP 2+.


BL LE: Calves supple and nontender without erythema, edema or palpable cords 





Assessment:


[]POD 1 s/p left total hip arthroplasty





Plan:


[]WBAT


PT/OT


Urinalysis rfx culture ordered. Watch urine for continued blood, if worsens or 

not resolving urology consult.


Can bring own saw palmetto which he takes regularly for BPH. Also asked to 

resume [place] Wort which he may take as long as he takes it consistently, 

aware this can alter INR POST-OP DIAGNOSIS:  Melanoma 16-Oct-2019 12:43:49  Freddie Watts